# Patient Record
Sex: FEMALE | Race: OTHER | HISPANIC OR LATINO | URBAN - METROPOLITAN AREA
[De-identification: names, ages, dates, MRNs, and addresses within clinical notes are randomized per-mention and may not be internally consistent; named-entity substitution may affect disease eponyms.]

---

## 2017-07-11 ENCOUNTER — INPATIENT (INPATIENT)
Facility: HOSPITAL | Age: 27
LOS: 0 days | Discharge: HOME | DRG: 343 | End: 2017-07-12
Attending: SURGERY | Admitting: SURGERY
Payer: MEDICAID

## 2017-07-11 ENCOUNTER — RESULT REVIEW (OUTPATIENT)
Age: 27
End: 2017-07-11

## 2017-07-11 VITALS
OXYGEN SATURATION: 99 % | SYSTOLIC BLOOD PRESSURE: 127 MMHG | HEART RATE: 78 BPM | TEMPERATURE: 99 F | DIASTOLIC BLOOD PRESSURE: 84 MMHG | RESPIRATION RATE: 18 BRPM

## 2017-07-11 DIAGNOSIS — K35.3 ACUTE APPENDICITIS WITH LOCALIZED PERITONITIS: ICD-10-CM

## 2017-07-11 DIAGNOSIS — Z98.51 TUBAL LIGATION STATUS: Chronic | ICD-10-CM

## 2017-07-11 DIAGNOSIS — R10.9 UNSPECIFIED ABDOMINAL PAIN: ICD-10-CM

## 2017-07-11 LAB
ALBUMIN SERPL ELPH-MCNC: 4.4 G/DL — SIGNIFICANT CHANGE UP (ref 3.3–5)
ALP SERPL-CCNC: 63 U/L — SIGNIFICANT CHANGE UP (ref 40–120)
ALT FLD-CCNC: 19 U/L RC — SIGNIFICANT CHANGE UP (ref 10–45)
ANION GAP SERPL CALC-SCNC: 15 MMOL/L — SIGNIFICANT CHANGE UP (ref 5–17)
APPEARANCE UR: CLEAR — SIGNIFICANT CHANGE UP
APTT BLD: 28.5 SEC — SIGNIFICANT CHANGE UP (ref 27.5–37.4)
AST SERPL-CCNC: 20 U/L — SIGNIFICANT CHANGE UP (ref 10–40)
BASOPHILS # BLD AUTO: 0 K/UL — SIGNIFICANT CHANGE UP (ref 0–0.2)
BASOPHILS NFR BLD AUTO: 0.4 % — SIGNIFICANT CHANGE UP (ref 0–2)
BILIRUB SERPL-MCNC: 0.2 MG/DL — SIGNIFICANT CHANGE UP (ref 0.2–1.2)
BILIRUB UR-MCNC: NEGATIVE — SIGNIFICANT CHANGE UP
BLD GP AB SCN SERPL QL: NEGATIVE — SIGNIFICANT CHANGE UP
BUN SERPL-MCNC: 11 MG/DL — SIGNIFICANT CHANGE UP (ref 7–23)
CALCIUM SERPL-MCNC: 8.8 MG/DL — SIGNIFICANT CHANGE UP (ref 8.4–10.5)
CHLORIDE SERPL-SCNC: 103 MMOL/L — SIGNIFICANT CHANGE UP (ref 96–108)
CO2 SERPL-SCNC: 23 MMOL/L — SIGNIFICANT CHANGE UP (ref 22–31)
COLOR SPEC: YELLOW — SIGNIFICANT CHANGE UP
CREAT SERPL-MCNC: 0.59 MG/DL — SIGNIFICANT CHANGE UP (ref 0.5–1.3)
DIFF PNL FLD: ABNORMAL
EOSINOPHIL # BLD AUTO: 0.1 K/UL — SIGNIFICANT CHANGE UP (ref 0–0.5)
EOSINOPHIL NFR BLD AUTO: 1.3 % — SIGNIFICANT CHANGE UP (ref 0–6)
EPI CELLS # UR: SIGNIFICANT CHANGE UP /HPF
GAS PNL BLDV: SIGNIFICANT CHANGE UP
GLUCOSE SERPL-MCNC: 117 MG/DL — HIGH (ref 70–99)
GLUCOSE UR QL: NEGATIVE — SIGNIFICANT CHANGE UP
HCG SERPL-ACNC: <2 MIU/ML — SIGNIFICANT CHANGE UP
HCT VFR BLD CALC: 39.2 % — SIGNIFICANT CHANGE UP (ref 34.5–45)
HGB BLD-MCNC: 13.8 G/DL — SIGNIFICANT CHANGE UP (ref 11.5–15.5)
INR BLD: 1.02 RATIO — SIGNIFICANT CHANGE UP (ref 0.88–1.16)
KETONES UR-MCNC: NEGATIVE — SIGNIFICANT CHANGE UP
LEUKOCYTE ESTERASE UR-ACNC: NEGATIVE — SIGNIFICANT CHANGE UP
LYMPHOCYTES # BLD AUTO: 2.2 K/UL — SIGNIFICANT CHANGE UP (ref 1–3.3)
LYMPHOCYTES # BLD AUTO: 24.5 % — SIGNIFICANT CHANGE UP (ref 13–44)
MCHC RBC-ENTMCNC: 30.9 PG — SIGNIFICANT CHANGE UP (ref 27–34)
MCHC RBC-ENTMCNC: 35.1 GM/DL — SIGNIFICANT CHANGE UP (ref 32–36)
MCV RBC AUTO: 88 FL — SIGNIFICANT CHANGE UP (ref 80–100)
MONOCYTES # BLD AUTO: 0.5 K/UL — SIGNIFICANT CHANGE UP (ref 0–0.9)
MONOCYTES NFR BLD AUTO: 5.8 % — SIGNIFICANT CHANGE UP (ref 2–14)
NEUTROPHILS # BLD AUTO: 6.2 K/UL — SIGNIFICANT CHANGE UP (ref 1.8–7.4)
NEUTROPHILS NFR BLD AUTO: 67.9 % — SIGNIFICANT CHANGE UP (ref 43–77)
NITRITE UR-MCNC: NEGATIVE — SIGNIFICANT CHANGE UP
PH UR: 6 — SIGNIFICANT CHANGE UP (ref 5–8)
PLATELET # BLD AUTO: 184 K/UL — SIGNIFICANT CHANGE UP (ref 150–400)
POTASSIUM SERPL-MCNC: 3.6 MMOL/L — SIGNIFICANT CHANGE UP (ref 3.5–5.3)
POTASSIUM SERPL-SCNC: 3.6 MMOL/L — SIGNIFICANT CHANGE UP (ref 3.5–5.3)
PROT SERPL-MCNC: 7.5 G/DL — SIGNIFICANT CHANGE UP (ref 6–8.3)
PROT UR-MCNC: NEGATIVE — SIGNIFICANT CHANGE UP
PROTHROM AB SERPL-ACNC: 11.1 SEC — SIGNIFICANT CHANGE UP (ref 9.8–12.7)
RBC # BLD: 4.46 M/UL — SIGNIFICANT CHANGE UP (ref 3.8–5.2)
RBC # FLD: 11.6 % — SIGNIFICANT CHANGE UP (ref 10.3–14.5)
RH IG SCN BLD-IMP: POSITIVE — SIGNIFICANT CHANGE UP
SODIUM SERPL-SCNC: 141 MMOL/L — SIGNIFICANT CHANGE UP (ref 135–145)
SP GR SPEC: 1.02 — SIGNIFICANT CHANGE UP (ref 1.01–1.02)
UROBILINOGEN FLD QL: NEGATIVE — SIGNIFICANT CHANGE UP
WBC # BLD: 9.2 K/UL — SIGNIFICANT CHANGE UP (ref 3.8–10.5)
WBC # FLD AUTO: 9.2 K/UL — SIGNIFICANT CHANGE UP (ref 3.8–10.5)
WBC UR QL: SIGNIFICANT CHANGE UP /HPF (ref 0–5)

## 2017-07-11 PROCEDURE — 99285 EMERGENCY DEPT VISIT HI MDM: CPT | Mod: 25

## 2017-07-11 PROCEDURE — 76830 TRANSVAGINAL US NON-OB: CPT | Mod: 26

## 2017-07-11 PROCEDURE — 44970 LAPAROSCOPY APPENDECTOMY: CPT

## 2017-07-11 PROCEDURE — 74177 CT ABD & PELVIS W/CONTRAST: CPT | Mod: 26

## 2017-07-11 PROCEDURE — 93975 VASCULAR STUDY: CPT | Mod: 26

## 2017-07-11 RX ORDER — KETOROLAC TROMETHAMINE 30 MG/ML
15 SYRINGE (ML) INJECTION ONCE
Qty: 0 | Refills: 0 | Status: DISCONTINUED | OUTPATIENT
Start: 2017-07-11 | End: 2017-07-11

## 2017-07-11 RX ORDER — ACETAMINOPHEN 500 MG
650 TABLET ORAL ONCE
Qty: 0 | Refills: 0 | Status: COMPLETED | OUTPATIENT
Start: 2017-07-11 | End: 2017-07-11

## 2017-07-11 RX ORDER — ACETAMINOPHEN 500 MG
650 TABLET ORAL ONCE
Qty: 0 | Refills: 0 | Status: DISCONTINUED | OUTPATIENT
Start: 2017-07-11 | End: 2017-07-11

## 2017-07-11 RX ORDER — MORPHINE SULFATE 50 MG/1
2 CAPSULE, EXTENDED RELEASE ORAL ONCE
Qty: 0 | Refills: 0 | Status: DISCONTINUED | OUTPATIENT
Start: 2017-07-11 | End: 2017-07-11

## 2017-07-11 RX ORDER — SODIUM CHLORIDE 9 MG/ML
1000 INJECTION, SOLUTION INTRAVENOUS ONCE
Qty: 0 | Refills: 0 | Status: COMPLETED | OUTPATIENT
Start: 2017-07-11 | End: 2017-07-11

## 2017-07-11 RX ORDER — ACETAMINOPHEN 500 MG
1000 TABLET ORAL ONCE
Qty: 0 | Refills: 0 | Status: COMPLETED | OUTPATIENT
Start: 2017-07-11 | End: 2017-07-11

## 2017-07-11 RX ORDER — ONDANSETRON 8 MG/1
4 TABLET, FILM COATED ORAL ONCE
Qty: 0 | Refills: 0 | Status: COMPLETED | OUTPATIENT
Start: 2017-07-11 | End: 2017-07-11

## 2017-07-11 RX ORDER — SODIUM CHLORIDE 9 MG/ML
2000 INJECTION INTRAMUSCULAR; INTRAVENOUS; SUBCUTANEOUS ONCE
Qty: 0 | Refills: 0 | Status: COMPLETED | OUTPATIENT
Start: 2017-07-11 | End: 2017-07-11

## 2017-07-11 RX ORDER — MORPHINE SULFATE 50 MG/1
4 CAPSULE, EXTENDED RELEASE ORAL ONCE
Qty: 0 | Refills: 0 | Status: DISCONTINUED | OUTPATIENT
Start: 2017-07-11 | End: 2017-07-11

## 2017-07-11 RX ORDER — SODIUM CHLORIDE 9 MG/ML
1000 INJECTION INTRAMUSCULAR; INTRAVENOUS; SUBCUTANEOUS ONCE
Qty: 0 | Refills: 0 | Status: COMPLETED | OUTPATIENT
Start: 2017-07-11 | End: 2017-07-11

## 2017-07-11 RX ORDER — PIPERACILLIN AND TAZOBACTAM 4; .5 G/20ML; G/20ML
3.38 INJECTION, POWDER, LYOPHILIZED, FOR SOLUTION INTRAVENOUS ONCE
Qty: 0 | Refills: 0 | Status: COMPLETED | OUTPATIENT
Start: 2017-07-11 | End: 2017-07-11

## 2017-07-11 RX ADMIN — Medication 15 MILLIGRAM(S): at 13:09

## 2017-07-11 RX ADMIN — MORPHINE SULFATE 4 MILLIGRAM(S): 50 CAPSULE, EXTENDED RELEASE ORAL at 22:30

## 2017-07-11 RX ADMIN — ONDANSETRON 4 MILLIGRAM(S): 8 TABLET, FILM COATED ORAL at 09:09

## 2017-07-11 RX ADMIN — MORPHINE SULFATE 2 MILLIGRAM(S): 50 CAPSULE, EXTENDED RELEASE ORAL at 08:25

## 2017-07-11 RX ADMIN — PIPERACILLIN AND TAZOBACTAM 200 GRAM(S): 4; .5 INJECTION, POWDER, LYOPHILIZED, FOR SOLUTION INTRAVENOUS at 18:03

## 2017-07-11 RX ADMIN — MORPHINE SULFATE 2 MILLIGRAM(S): 50 CAPSULE, EXTENDED RELEASE ORAL at 13:09

## 2017-07-11 RX ADMIN — Medication 15 MILLIGRAM(S): at 15:02

## 2017-07-11 RX ADMIN — MORPHINE SULFATE 4 MILLIGRAM(S): 50 CAPSULE, EXTENDED RELEASE ORAL at 21:20

## 2017-07-11 RX ADMIN — MORPHINE SULFATE 4 MILLIGRAM(S): 50 CAPSULE, EXTENDED RELEASE ORAL at 17:57

## 2017-07-11 RX ADMIN — SODIUM CHLORIDE 1000 MILLILITER(S): 9 INJECTION INTRAMUSCULAR; INTRAVENOUS; SUBCUTANEOUS at 08:25

## 2017-07-11 RX ADMIN — Medication 650 MILLIGRAM(S): at 23:06

## 2017-07-11 RX ADMIN — MORPHINE SULFATE 4 MILLIGRAM(S): 50 CAPSULE, EXTENDED RELEASE ORAL at 13:09

## 2017-07-11 RX ADMIN — Medication 1000 MILLIGRAM(S): at 09:19

## 2017-07-11 RX ADMIN — Medication 400 MILLIGRAM(S): at 07:55

## 2017-07-11 RX ADMIN — MORPHINE SULFATE 2 MILLIGRAM(S): 50 CAPSULE, EXTENDED RELEASE ORAL at 10:04

## 2017-07-11 RX ADMIN — ONDANSETRON 4 MILLIGRAM(S): 8 TABLET, FILM COATED ORAL at 10:02

## 2017-07-11 RX ADMIN — SODIUM CHLORIDE 2000 MILLILITER(S): 9 INJECTION INTRAMUSCULAR; INTRAVENOUS; SUBCUTANEOUS at 15:02

## 2017-07-11 RX ADMIN — Medication 15 MILLIGRAM(S): at 17:57

## 2017-07-11 RX ADMIN — MORPHINE SULFATE 2 MILLIGRAM(S): 50 CAPSULE, EXTENDED RELEASE ORAL at 09:09

## 2017-07-11 RX ADMIN — SODIUM CHLORIDE 1000 MILLILITER(S): 9 INJECTION, SOLUTION INTRAVENOUS at 18:03

## 2017-07-11 RX ADMIN — Medication 15 MILLIGRAM(S): at 10:02

## 2017-07-11 NOTE — ED ADULT NURSE REASSESSMENT NOTE - NS ED NURSE REASSESS COMMENT FT1
pt states she is feeling better, still experiencing 5/10 right abdominal pain and headache. pt vital signs taken and documented. pt has been ambulatory to the bathroom with her . no longer having chills.   still observing the patient.

## 2017-07-11 NOTE — ED ADULT NURSE REASSESSMENT NOTE - NS ED NURSE REASSESS COMMENT FT1
pt laying on side in pain, discussing with team the need for pain medication. pending order.    remains at bedside.

## 2017-07-11 NOTE — ED PROVIDER NOTE - MEDICAL DECISION MAKING DETAILS
Mojgan Eubanks, DO: 27F with complicated OB hx here for diffuse abdominal pain acutely worse since last night. Evaluation for ectopic vs. torsion given adnexal TTP. Suspicious for hemorrhagic cyst. Pt with +McBurneys & +Oberlin signs, will eval for biliary pathology on US. If negative w/u, will likely CTAP to rule out appendiceal.

## 2017-07-11 NOTE — H&P ADULT - HISTORY OF PRESENT ILLNESS
27F, h/o ectopic pregnancy and tubal ligation, presents complaining of 10 days of abdominal pain. Denies fevers/chills at home. Reports nausea. Passing stool and gas.

## 2017-07-11 NOTE — ED PROVIDER NOTE - PHYSICAL EXAMINATION
Mojgan Eubanks, DO:   Gen: Well appearing, NAD  Head: NCAT  HEENT: PERRL, MMM, normal conjunctiva, anicteric, neck supple  Lung: CTAB, no adventitious sounds  CV: RRR, no murmurs, rubs or gallops  Abd: soft, TTP diffusely with +McBurney's & +Olsen's sign, greatest in RLQ. No peritoneal signs. No rebound. No CVAT.  : external genitalia normal, no CMT, os closed, left adnexal TTP   MSK: No edema, no visible deformities  Neuro: No focal neurologic deficits. CN II-XII grossly intact. 5/5 strength and normal sensation in all extremities.  Skin: Warm and dry, no evidence of rash  Psych: normal mood and affect Mojgan Eubanks, DO:   Gen: Well appearing, NAD  Head: NCAT  HEENT: PERRL, MMM, normal conjunctiva, anicteric, neck supple  Lung: CTAB, no adventitious sounds  CV: RRR, no murmurs, rubs or gallops  Abd: soft, TTP diffusely with +McBurney's & +Olsen's sign, greatest in RLQ. No peritoneal signs. No rebound. No CVAT.  : external genitalia normal, no CMT, os closed, left adnexal TTP (Chaperone: Fredy JANG, medical student).  MSK: No edema, no visible deformities  Neuro: No focal neurologic deficits. CN II-XII grossly intact. 5/5 strength and normal sensation in all extremities.  Skin: Warm and dry, no evidence of rash  Psych: normal mood and affect

## 2017-07-11 NOTE — ED PROVIDER NOTE - NS ED ROS FT
Mojgan Eubanks, DO: ROS: denies HA, weakness, dizziness, chest pain, SOB, diaphoresis, diarrhea, joint pain, neuro deficits, dysuria/hematuria, rash.

## 2017-07-11 NOTE — ED ADULT NURSE NOTE - OBJECTIVE STATEMENT
27 y.o female presents ambulatory c.o abdominal pain for 10 days that worsened last night around midnight. diffuse 10/10 generalized abdominal pain with nausea, denies vomiting/diarrhea/ urinary symptoms/ back pain. pt took Tylenol for pain, denies relief. states she did not go to the doctor during the 10 days because she believed the pain would resolve. hx of dm, Salpingectomy s.p delivery last year. denies chest pain/sob/fever/chills, no recent travel, no blood in urine/ stool. tolerating po well. boyfriend at the bedside.   Patient undressed and placed into gown, call bell in hand and side rails up for safety. warm blanket provided, vital signs stable, pt in no acute distress.

## 2017-07-11 NOTE — ED ADULT NURSE REASSESSMENT NOTE - NS ED NURSE REASSESS COMMENT FT1
reassessment performed, pt tearful, shaking and rigors noted. vital signs taken and documented. pt c.o 10/10 abdominal pain that has not improved.   blankets from the warmer provided. MD at the bedside for reassessment, pain medications ordered for patient. reassessment performed, pt tearful, shaking and rigors noted. vital signs taken and documented. pt c.o 10/10 abdominal pain that has not improved.   blankets from the warmer provided. MD at the bedside for reassessment, pain medications ordered for patient. eyes appear red b/l, denies itching/dryness reassessment performed, pt tearful, shaking and rigors noted. vital signs taken and documented. pt c.o 10/10 abdominal pain that has not improved.   blankets from the warmer provided. MD at the bedside for reassessment, pain medications ordered for patient. eyes appear red b/l, denies itching/dryness  discussed with resident the need for further assessment.

## 2017-07-11 NOTE — ED PROVIDER NOTE - ATTENDING CONTRIBUTION TO CARE
patient complaining of b/l lower abd pain. llq and rlq tenderness. r/o ectopic.  if not pregnant, concern for ovarian pathology. will perform US and reassess.

## 2017-07-11 NOTE — ED ADULT NURSE NOTE - CHPI ED SYMPTOMS NEG
no burning urination/no fever/no vomiting/no chills/no dysuria/no hematuria/no diarrhea/no abdominal distension/no blood in stool

## 2017-07-11 NOTE — ED PROCEDURE NOTE - PROCEDURE ADDITIONAL DETAILS
Emergency Department Focused Ultrasound performed at patient's bedside.  The complete report can be found in PACS.
Mojgan Eubanks DO: POCUS: Emergency Department Focused Ultrasound performed at patient's bedside.  The complete report will be available in PACS.

## 2017-07-11 NOTE — ED PROVIDER NOTE - OBJECTIVE STATEMENT
Mojgan Eubanks, DO: 27F with hx of tubal ligation & history of ectopic per chart review but patient denies here for diffuse abdominal pain x 10 days. Pain was controlled & intermittent, described as cramping until last night when it became constant. No exacerbating/relieving factors. Last night, pt started with nausea last night but no vomiting. No fevers/chills, back pain, dysuria, urinary frequency/urgency. LMP Friday w/ prior LMP 1 mo before. No vaginal discharge.    No PMD  OBGYN: clinic

## 2017-07-11 NOTE — ED ADULT NURSE REASSESSMENT NOTE - NS ED NURSE REASSESS COMMENT FT1
Recvd pt at 1930 awake, alert and responsive to all stimuli.  no sob or respiratory distress noted.  pt c/o RLQ pain and was medicated, as per md's orders.  pt has been admitted to surgical team and is awaiting admitting bed.  will continue to monitor.

## 2017-07-11 NOTE — H&P ADULT - NSHPLABSRESULTS_GEN_ALL_CORE
13.8   9.2   )-----------( 184      ( 2017 08:16 )             39.2                 PT/INR - ( 2017 08:16 )   PT: 11.1 sec;   INR: 1.02 ratio         PTT - ( 2017 08:16 )  PTT:28.5 sec        141  |  103  |  11  ----------------------------<  117<H>  3.6   |  23  |  0.59    Ca    8.8      2017 08:16    TPro  7.5  /  Alb  4.4  /  TBili  0.2  /  DBili  x   /  AST  20  /  ALT  19  /  AlkPhos  63      HCG < 2    LIVER FUNCTIONS - ( 2017 08:16 )  Alb: 4.4 g/dL / Pro: 7.5 g/dL / ALK PHOS: 63 U/L / ALT: 19 U/L RC / AST: 20 U/L / GGT: x             Urinalysis Basic - ( 2017 09:59 )    Color: Yellow / Appearance: Clear / S.022 / pH: x  Gluc: x / Ketone: Negative  / Bili: Negative / Urobili: Negative   Blood: x / Protein: Negative / Nitrite: Negative   Leuk Esterase: Negative / RBC: x / WBC 0-2 /HPF   Sq Epi: x / Non Sq Epi: OCC /HPF / Bacteria: x    IMAGING:  < from: CT Abdomen and Pelvis w/ Oral Cont and w/ IV Cont (17 @ 16:56) >    Acute early appendicitis. No evidence of abscess or   perforation.    < end of copied text >

## 2017-07-11 NOTE — ED PROVIDER NOTE - PROGRESS NOTE DETAILS
Mojgan Eubanks DO: Pt initially declined zofran. 1 episode of NBNB vomiting at bedside. Will give zofran & additional pain medications now. Mojgan Eubanks DO: No evidence of hemorrhagic cysts or free fluids on US. Pt with patel score 3 - will provide expectant management & d/c home. Mojgan Eubanks DO: Reassessed pt, pt w/ diffuse TTP. Discussed with attending, plan to CTAP. Mojgan Eubanks, DO: No evidence of hemorrhagic cysts or free fluids on US. Pt with patel score 3 - plan to provide expectant management & d/c home. However, pt still in US, will reassess when she returns prior to d/c. ZR ct scan revealed acute AP surgery called

## 2017-07-11 NOTE — ED ADULT NURSE REASSESSMENT NOTE - NS ED NURSE REASSESS COMMENT FT1
discussed with resident about plan of care and patients appearance.   pt vital signs documented, bp low. fluids administered per order and pain medication given. held morphine due to low bp.   pt states pain is increasing again, tachycardic 112, diaphoretic with temperature of 99.4.   warm blankets provided. pt resting in bed waiting for ct scan.

## 2017-07-11 NOTE — ED ADULT NURSE REASSESSMENT NOTE - NS ED NURSE REASSESS COMMENT FT1
pt continues to complain of 10/10 abdominal pain. understands that she is going up for an ultrasound.   pt given pain medication per order.

## 2017-07-12 VITALS
HEART RATE: 72 BPM | DIASTOLIC BLOOD PRESSURE: 61 MMHG | SYSTOLIC BLOOD PRESSURE: 107 MMHG | TEMPERATURE: 98 F | RESPIRATION RATE: 18 BRPM | OXYGEN SATURATION: 99 %

## 2017-07-12 PROCEDURE — 80053 COMPREHEN METABOLIC PANEL: CPT

## 2017-07-12 PROCEDURE — 81001 URINALYSIS AUTO W/SCOPE: CPT

## 2017-07-12 PROCEDURE — 96376 TX/PRO/DX INJ SAME DRUG ADON: CPT

## 2017-07-12 PROCEDURE — 82803 BLOOD GASES ANY COMBINATION: CPT

## 2017-07-12 PROCEDURE — 84295 ASSAY OF SERUM SODIUM: CPT

## 2017-07-12 PROCEDURE — 84132 ASSAY OF SERUM POTASSIUM: CPT

## 2017-07-12 PROCEDURE — 86900 BLOOD TYPING SEROLOGIC ABO: CPT

## 2017-07-12 PROCEDURE — 82947 ASSAY GLUCOSE BLOOD QUANT: CPT

## 2017-07-12 PROCEDURE — 76705 ECHO EXAM OF ABDOMEN: CPT | Mod: 26

## 2017-07-12 PROCEDURE — 74177 CT ABD & PELVIS W/CONTRAST: CPT

## 2017-07-12 PROCEDURE — 44970 LAPAROSCOPY APPENDECTOMY: CPT

## 2017-07-12 PROCEDURE — 88304 TISSUE EXAM BY PATHOLOGIST: CPT

## 2017-07-12 PROCEDURE — 85027 COMPLETE CBC AUTOMATED: CPT

## 2017-07-12 PROCEDURE — 83605 ASSAY OF LACTIC ACID: CPT

## 2017-07-12 PROCEDURE — 96374 THER/PROPH/DIAG INJ IV PUSH: CPT | Mod: XU

## 2017-07-12 PROCEDURE — 85014 HEMATOCRIT: CPT

## 2017-07-12 PROCEDURE — 76830 TRANSVAGINAL US NON-OB: CPT

## 2017-07-12 PROCEDURE — 82435 ASSAY OF BLOOD CHLORIDE: CPT

## 2017-07-12 PROCEDURE — 88304 TISSUE EXAM BY PATHOLOGIST: CPT | Mod: 26

## 2017-07-12 PROCEDURE — 85730 THROMBOPLASTIN TIME PARTIAL: CPT

## 2017-07-12 PROCEDURE — 93975 VASCULAR STUDY: CPT

## 2017-07-12 PROCEDURE — 76705 ECHO EXAM OF ABDOMEN: CPT

## 2017-07-12 PROCEDURE — 86850 RBC ANTIBODY SCREEN: CPT

## 2017-07-12 PROCEDURE — 82330 ASSAY OF CALCIUM: CPT

## 2017-07-12 PROCEDURE — 86901 BLOOD TYPING SEROLOGIC RH(D): CPT

## 2017-07-12 PROCEDURE — 85610 PROTHROMBIN TIME: CPT

## 2017-07-12 PROCEDURE — 84702 CHORIONIC GONADOTROPIN TEST: CPT

## 2017-07-12 PROCEDURE — 96375 TX/PRO/DX INJ NEW DRUG ADDON: CPT

## 2017-07-12 PROCEDURE — 99285 EMERGENCY DEPT VISIT HI MDM: CPT | Mod: 25

## 2017-07-12 RX ORDER — SODIUM CHLORIDE 9 MG/ML
1000 INJECTION, SOLUTION INTRAVENOUS
Qty: 0 | Refills: 0 | Status: DISCONTINUED | OUTPATIENT
Start: 2017-07-12 | End: 2017-07-12

## 2017-07-12 RX ORDER — HYDROMORPHONE HYDROCHLORIDE 2 MG/ML
0.5 INJECTION INTRAMUSCULAR; INTRAVENOUS; SUBCUTANEOUS
Qty: 0 | Refills: 0 | Status: DISCONTINUED | OUTPATIENT
Start: 2017-07-12 | End: 2017-07-12

## 2017-07-12 RX ADMIN — SODIUM CHLORIDE 100 MILLILITER(S): 9 INJECTION, SOLUTION INTRAVENOUS at 06:45

## 2017-07-12 NOTE — CHART NOTE - NSCHARTNOTEFT_GEN_A_CORE
S: Patient seen and examined. Resting comfortably in bed. Endorses mild RLQ pain. Voiding    O: Vital Signs Last 24 Hrs  T(C): 36.8 (12 Jul 2017 04:00), Max: 38.6 (11 Jul 2017 23:44)  T(F): 98.2 (12 Jul 2017 04:00), Max: 101.5 (11 Jul 2017 23:44)  HR: 73 (12 Jul 2017 05:00) (69 - 118)  BP: 101/52 (12 Jul 2017 05:00) (91/54 - 133/85)  BP(mean): 71 (12 Jul 2017 05:00) (71 - 76)  RR: 14 (12 Jul 2017 05:00) (14 - 20)  SpO2: 97% (12 Jul 2017 05:00) (95% - 100%)    Gen: resting comfortably  Resp: CTAB  CV: RRR  Abd: soft, appropriately tender in RLQ, ND  Inscision: c/d/i.    A/P:  27F s/p uncomplicated lap appy.   -Stable in PACU, when clear criteria can go home after 6 hrs observation.

## 2017-07-12 NOTE — ASU DISCHARGE PLAN (ADULT/PEDIATRIC). - INSTRUCTIONS
Call Dr. Mishra's office at [phone] to schedule a follow up appointment for 1-2 weeks from now.

## 2017-07-12 NOTE — ASU DISCHARGE PLAN (ADULT/PEDIATRIC). - MEDICATION SUMMARY - MEDICATIONS TO TAKE
I will START or STAY ON the medications listed below when I get home from the hospital:    oxycodone-acetaminophen 5mg-325mg oral tablet  -- 1 tab(s) by mouth every 6 hours, As Needed -for moderate pain MDD:8  -- Caution federal law prohibits the transfer of this drug to any person other  than the person for whom it was prescribed.  May cause drowsiness.  Alcohol may intensify this effect.  Use care when operating dangerous machinery.  This prescription cannot be refilled.  This product contains acetaminophen.  Do not use  with any other product containing acetaminophen to prevent possible liver damage.  Using more of this medication than prescribed may cause serious breathing problems.    -- Indication: For Post-operative Pain

## 2017-07-12 NOTE — ASU DISCHARGE PLAN (ADULT/PEDIATRIC). - NOTIFY
Persistent Nausea and Vomiting/Pain not relieved by Medications/Fever greater than 101/Bleeding that does not stop/Unable to Urinate/Inability to Tolerate Liquids or Foods

## 2017-07-18 ENCOUNTER — APPOINTMENT (OUTPATIENT)
Dept: TRAUMA SURGERY | Facility: CLINIC | Age: 27
End: 2017-07-18

## 2017-07-18 VITALS
TEMPERATURE: 98.2 F | WEIGHT: 144 LBS | HEIGHT: 63.5 IN | HEART RATE: 69 BPM | SYSTOLIC BLOOD PRESSURE: 123 MMHG | BODY MASS INDEX: 25.2 KG/M2 | DIASTOLIC BLOOD PRESSURE: 76 MMHG

## 2017-07-18 DIAGNOSIS — K35.3 ACUTE APPENDICITIS WITH LOCALIZED PERITONITIS: ICD-10-CM

## 2017-07-18 LAB — SURGICAL PATHOLOGY STUDY: SIGNIFICANT CHANGE UP

## 2017-07-20 ENCOUNTER — TRANSCRIPTION ENCOUNTER (OUTPATIENT)
Age: 27
End: 2017-07-20

## 2018-10-21 NOTE — ED ADULT NURSE NOTE - ED STAT RN HAND OFF
MR#: [id number]

Account#: [id number]

Accession#: [id number]

Date of Study: 10/21/2018

Ordering Physician: PATRICIA DAWSON, 

Referring Physician: TORIE MAXWELL 

Tech: Loni Nellie





--------------- APPROVED REPORT --------------





EXAM: Two-dimensional and M-mode echocardiogram with Doppler and color Doppler.



Other Information 

Quality : AverageHR: 80bpm

Rhythm : NSR



INDICATION

Thrombus 

PVC's



2D DIMENSIONS 

RVDd1.4 (2.9-3.5cm)Left Atrium(2D)2.5 (1.6-4.0cm)

IVSd1.3 (0.7-1.1cm)Aortic Root(2D)3.2 (2.0-3.7cm)

LVDd4.6 (3.9-5.9cm)LVOT Diameter2.0 (1.8-2.4cm)

PWd1.3 (0.7-1.1cm)LVDs3.4 (2.5-4.0cm)

FS (%) 24.7 %SV47.1 ml

LVEF(%)49.0 (>50%)



Aortic Valve

AoV Peak Marshall.155.1cm/sAoV VTI28.3cm

AO Peak GR.9.6mmHgLVOT Peak Marshall.86.9cm/s

AO Mean GR.5mmHgAVA (VMAX)1.72cm2



Mitral Valve

MV E Uvpobbdd35.6cm/sMV DECEL JOEL720uh

MV A Cvpdlgub50.3cm/sE/A  Ratio0.5



Pulmonary Valve

PV Peak Xvdubesq36.0cm/s



Tricuspid Valve

TR P. Gklzytsw249al/sRAP AEUNPOXK5fiFl

TR Peak Gr.80haGsIUPR53xpDr



Pulmonary Vein

S1 Mjspsegb91.7cm/sD2 Bmzohhom22.1cm/s

PVa swixshfv189ephi



 LEFT VENTRICLE 

The left ventricle is normal size. There is moderate concentric left ventricular hypertrophy. The lef
t ventricular systolic function is low normal. Difficult to estimate due to PVC's. EF 45-50% Septal m
otion consistent with conduction abnormality. Transmitral Doppler flow pattern is Grade I-abnormal re
laxation pattern.



 RIGHT VENTRICLE 

The right ventricle is normal size. There is normal right ventricular wall thickness. The right ventr
icular systolic function is normal.



 ATRIA 

The left atrium size is normal. The right atrium size is normal. The interatrial septum is intact wit
h no evidence for an atrial septal defect or patent foramen ovale as noted on 2-D or Doppler imaging.




 AORTIC VALVE 

The aortic valve is normal in structure and function. Doppler and Color Flow revealed trace aortic re
gurgitation. Calculated aortic valve area is 2.1 cm2 with maximum pressure gradient of 10 mmHg and me
an pressure gradient of 6 mmHg.



 MITRAL VALVE 

The mitral valve is thickened but opens well. There is no mitral valve stenosis. Doppler and Color-fl
ow revealed trace mitral regurgitation.



 TRICUSPID VALVE 

The tricuspid valve is normal in structure and function. Doppler and Color Flow revealed trace tricus
pid regurgitation. There is no tricuspid valve stenosis. 



 PULMONIC VALVE 

The pulmonic valve is not well visualized. Doppler and Color Flow revealed trace pulmonic valvular re
gurgitation. There is no pulmonic valvular stenosis.



 GREAT VESSELS 

The aortic root is normal in size. The IVC is normal in size and collapses >50% with inspiration.



 PERICARDIAL EFFUSION 

There is no evidence of significant pericardial effusion.



Critical Notification

Critical Value: No



<Conclusion>

The left ventricular systolic function is low normal. Difficult to estimate due to PVC's. EF 45-50%

There is moderate concentric left ventricular hypertrophy.



Signed by : Patricia Dawson, 

Electronically Approved : 10/21/2018 12:42:01 Handoff

## 2019-09-13 NOTE — ED ADULT NURSE NOTE - LATERALITY
Patient came in today looking to give a kidney to her grandmother.  Discussion was done regarding donor safety  Focus on BMI with a goal of 175lbs was done. No workup started at this time.  She was instructed to call once her weight goal was obtained.     generalized

## 2019-09-25 ENCOUNTER — EMERGENCY (EMERGENCY)
Facility: HOSPITAL | Age: 29
LOS: 1 days | Discharge: HOME | End: 2019-09-25
Attending: EMERGENCY MEDICINE
Payer: MEDICAID

## 2019-09-25 VITALS
SYSTOLIC BLOOD PRESSURE: 170 MMHG | HEART RATE: 82 BPM | WEIGHT: 160.94 LBS | RESPIRATION RATE: 18 BRPM | OXYGEN SATURATION: 99 % | TEMPERATURE: 98 F | HEIGHT: 63 IN | DIASTOLIC BLOOD PRESSURE: 100 MMHG

## 2019-09-25 VITALS
DIASTOLIC BLOOD PRESSURE: 87 MMHG | HEART RATE: 88 BPM | SYSTOLIC BLOOD PRESSURE: 142 MMHG | RESPIRATION RATE: 18 BRPM | TEMPERATURE: 98 F | OXYGEN SATURATION: 99 %

## 2019-09-25 DIAGNOSIS — Z98.51 TUBAL LIGATION STATUS: Chronic | ICD-10-CM

## 2019-09-25 LAB
ALBUMIN SERPL ELPH-MCNC: 4.2 G/DL — SIGNIFICANT CHANGE UP (ref 3.3–5)
ALBUMIN SERPL ELPH-MCNC: 4.5 G/DL — SIGNIFICANT CHANGE UP (ref 3.3–5)
ALP SERPL-CCNC: 69 U/L — SIGNIFICANT CHANGE UP (ref 40–120)
ALP SERPL-CCNC: 70 U/L — SIGNIFICANT CHANGE UP (ref 40–120)
ALT FLD-CCNC: 113 U/L — HIGH (ref 10–45)
ALT FLD-CCNC: 119 U/L — HIGH (ref 10–45)
ANION GAP SERPL CALC-SCNC: 11 MMOL/L — SIGNIFICANT CHANGE UP (ref 5–17)
ANION GAP SERPL CALC-SCNC: 15 MMOL/L — SIGNIFICANT CHANGE UP (ref 5–17)
APPEARANCE UR: ABNORMAL
APTT BLD: 28.3 SEC — SIGNIFICANT CHANGE UP (ref 27.5–36.3)
AST SERPL-CCNC: 111 U/L — HIGH (ref 10–40)
AST SERPL-CCNC: 144 U/L — HIGH (ref 10–40)
BACTERIA # UR AUTO: NEGATIVE — SIGNIFICANT CHANGE UP
BASOPHILS # BLD AUTO: 0 K/UL — SIGNIFICANT CHANGE UP (ref 0–0.2)
BASOPHILS NFR BLD AUTO: 0.6 % — SIGNIFICANT CHANGE UP (ref 0–2)
BILIRUB SERPL-MCNC: 0.3 MG/DL — SIGNIFICANT CHANGE UP (ref 0.2–1.2)
BILIRUB SERPL-MCNC: 0.4 MG/DL — SIGNIFICANT CHANGE UP (ref 0.2–1.2)
BILIRUB UR-MCNC: NEGATIVE — SIGNIFICANT CHANGE UP
BUN SERPL-MCNC: 7 MG/DL — SIGNIFICANT CHANGE UP (ref 7–23)
BUN SERPL-MCNC: 8 MG/DL — SIGNIFICANT CHANGE UP (ref 7–23)
CALCIUM SERPL-MCNC: 8.7 MG/DL — SIGNIFICANT CHANGE UP (ref 8.4–10.5)
CALCIUM SERPL-MCNC: 9.2 MG/DL — SIGNIFICANT CHANGE UP (ref 8.4–10.5)
CHLORIDE SERPL-SCNC: 101 MMOL/L — SIGNIFICANT CHANGE UP (ref 96–108)
CHLORIDE SERPL-SCNC: 103 MMOL/L — SIGNIFICANT CHANGE UP (ref 96–108)
CO2 SERPL-SCNC: 22 MMOL/L — SIGNIFICANT CHANGE UP (ref 22–31)
CO2 SERPL-SCNC: 23 MMOL/L — SIGNIFICANT CHANGE UP (ref 22–31)
COLOR SPEC: COLORLESS — SIGNIFICANT CHANGE UP
CREAT SERPL-MCNC: 0.55 MG/DL — SIGNIFICANT CHANGE UP (ref 0.5–1.3)
CREAT SERPL-MCNC: 0.56 MG/DL — SIGNIFICANT CHANGE UP (ref 0.5–1.3)
DIFF PNL FLD: NEGATIVE — SIGNIFICANT CHANGE UP
EOSINOPHIL # BLD AUTO: 0.1 K/UL — SIGNIFICANT CHANGE UP (ref 0–0.5)
EOSINOPHIL NFR BLD AUTO: 1.2 % — SIGNIFICANT CHANGE UP (ref 0–6)
EPI CELLS # UR: 3 /HPF — SIGNIFICANT CHANGE UP
GLUCOSE SERPL-MCNC: 90 MG/DL — SIGNIFICANT CHANGE UP (ref 70–99)
GLUCOSE SERPL-MCNC: 91 MG/DL — SIGNIFICANT CHANGE UP (ref 70–99)
GLUCOSE UR QL: NEGATIVE — SIGNIFICANT CHANGE UP
HCG SERPL-ACNC: HIGH MIU/ML
HCT VFR BLD CALC: 38.4 % — SIGNIFICANT CHANGE UP (ref 34.5–45)
HGB BLD-MCNC: 12.7 G/DL — SIGNIFICANT CHANGE UP (ref 11.5–15.5)
HYALINE CASTS # UR AUTO: 0 /LPF — SIGNIFICANT CHANGE UP (ref 0–2)
INR BLD: 1.02 RATIO — SIGNIFICANT CHANGE UP (ref 0.88–1.16)
KETONES UR-MCNC: NEGATIVE — SIGNIFICANT CHANGE UP
LEUKOCYTE ESTERASE UR-ACNC: ABNORMAL
LYMPHOCYTES # BLD AUTO: 2 K/UL — SIGNIFICANT CHANGE UP (ref 1–3.3)
LYMPHOCYTES # BLD AUTO: 26.1 % — SIGNIFICANT CHANGE UP (ref 13–44)
MCHC RBC-ENTMCNC: 29.2 PG — SIGNIFICANT CHANGE UP (ref 27–34)
MCHC RBC-ENTMCNC: 33.1 GM/DL — SIGNIFICANT CHANGE UP (ref 32–36)
MCV RBC AUTO: 88.2 FL — SIGNIFICANT CHANGE UP (ref 80–100)
MONOCYTES # BLD AUTO: 0.5 K/UL — SIGNIFICANT CHANGE UP (ref 0–0.9)
MONOCYTES NFR BLD AUTO: 6.4 % — SIGNIFICANT CHANGE UP (ref 2–14)
NEUTROPHILS # BLD AUTO: 5 K/UL — SIGNIFICANT CHANGE UP (ref 1.8–7.4)
NEUTROPHILS NFR BLD AUTO: 65.7 % — SIGNIFICANT CHANGE UP (ref 43–77)
NITRITE UR-MCNC: NEGATIVE — SIGNIFICANT CHANGE UP
PH UR: 7 — SIGNIFICANT CHANGE UP (ref 5–8)
PLATELET # BLD AUTO: 185 K/UL — SIGNIFICANT CHANGE UP (ref 150–400)
POTASSIUM SERPL-MCNC: 3.5 MMOL/L — SIGNIFICANT CHANGE UP (ref 3.5–5.3)
POTASSIUM SERPL-MCNC: 5.4 MMOL/L — HIGH (ref 3.5–5.3)
POTASSIUM SERPL-SCNC: 3.5 MMOL/L — SIGNIFICANT CHANGE UP (ref 3.5–5.3)
POTASSIUM SERPL-SCNC: 5.4 MMOL/L — HIGH (ref 3.5–5.3)
PROT SERPL-MCNC: 7.2 G/DL — SIGNIFICANT CHANGE UP (ref 6–8.3)
PROT SERPL-MCNC: 7.9 G/DL — SIGNIFICANT CHANGE UP (ref 6–8.3)
PROT UR-MCNC: NEGATIVE — SIGNIFICANT CHANGE UP
PROTHROM AB SERPL-ACNC: 11.7 SEC — SIGNIFICANT CHANGE UP (ref 10–12.9)
RBC # BLD: 4.35 M/UL — SIGNIFICANT CHANGE UP (ref 3.8–5.2)
RBC # FLD: 12.2 % — SIGNIFICANT CHANGE UP (ref 10.3–14.5)
RBC CASTS # UR COMP ASSIST: 2 /HPF — SIGNIFICANT CHANGE UP (ref 0–4)
SODIUM SERPL-SCNC: 137 MMOL/L — SIGNIFICANT CHANGE UP (ref 135–145)
SODIUM SERPL-SCNC: 138 MMOL/L — SIGNIFICANT CHANGE UP (ref 135–145)
SP GR SPEC: 1.01 — LOW (ref 1.01–1.02)
UROBILINOGEN FLD QL: NEGATIVE — SIGNIFICANT CHANGE UP
WBC # BLD: 7.5 K/UL — SIGNIFICANT CHANGE UP (ref 3.8–10.5)
WBC # FLD AUTO: 7.5 K/UL — SIGNIFICANT CHANGE UP (ref 3.8–10.5)
WBC UR QL: 5 /HPF — SIGNIFICANT CHANGE UP (ref 0–5)

## 2019-09-25 PROCEDURE — 99284 EMERGENCY DEPT VISIT MOD MDM: CPT

## 2019-09-25 PROCEDURE — 85610 PROTHROMBIN TIME: CPT

## 2019-09-25 PROCEDURE — 81001 URINALYSIS AUTO W/SCOPE: CPT

## 2019-09-25 PROCEDURE — 93975 VASCULAR STUDY: CPT | Mod: 26

## 2019-09-25 PROCEDURE — 87086 URINE CULTURE/COLONY COUNT: CPT

## 2019-09-25 PROCEDURE — 80053 COMPREHEN METABOLIC PANEL: CPT

## 2019-09-25 PROCEDURE — 93975 VASCULAR STUDY: CPT

## 2019-09-25 PROCEDURE — 76817 TRANSVAGINAL US OBSTETRIC: CPT | Mod: 26

## 2019-09-25 PROCEDURE — 85730 THROMBOPLASTIN TIME PARTIAL: CPT

## 2019-09-25 PROCEDURE — 84702 CHORIONIC GONADOTROPIN TEST: CPT

## 2019-09-25 PROCEDURE — 85027 COMPLETE CBC AUTOMATED: CPT

## 2019-09-25 PROCEDURE — 36415 COLL VENOUS BLD VENIPUNCTURE: CPT

## 2019-09-25 PROCEDURE — 76817 TRANSVAGINAL US OBSTETRIC: CPT

## 2019-09-25 RX ORDER — ONDANSETRON 8 MG/1
4 TABLET, FILM COATED ORAL ONCE
Refills: 0 | Status: COMPLETED | OUTPATIENT
Start: 2019-09-25 | End: 2019-09-25

## 2019-09-25 RX ORDER — CEPHALEXIN 500 MG
1 CAPSULE ORAL
Qty: 6 | Refills: 0
Start: 2019-09-25 | End: 2019-09-27

## 2019-09-25 RX ORDER — CEPHALEXIN 500 MG
500 CAPSULE ORAL ONCE
Refills: 0 | Status: COMPLETED | OUTPATIENT
Start: 2019-09-25 | End: 2019-09-25

## 2019-09-25 RX ADMIN — Medication 500 MILLIGRAM(S): at 20:39

## 2019-09-25 NOTE — ED ADULT TRIAGE NOTE - CHIEF COMPLAINT QUOTE
"I had my tubes tied 3 years ago and I have been having abd pain for 5 days now, I took a pregnancy test today and it was positive"; reports nausea; denies vaginal bleeding; LMP 7/12

## 2019-09-25 NOTE — ED PROVIDER NOTE - OBJECTIVE STATEMENT
28 y/o F  no PMHx, one episode of gestational DM otherwise uncomplicated prior pregnancy courses, PSHx b/l tubal ligation presents c/o suprapubic abdominal pain x 5 days. pain is constant, cramping, similar to menstrual cramping, no radiation, no palliative or provoking factors. Pain a/w nausea w/o vomiting. pt denies VB, vaginal DC, f/c, syncope, other abd pain, diarrhea, constipation, melena, hematochezia, rash, urinary sxs, recent illness. Sxs felt like prior pregnancy so did at home pregnancy test which was positive prompting pt to go to ED for evaluation. No h/o STI. 30 y/o F no PMHx, one episode of gestational DM and h/o ectopic pregnancy otherwise uncomplicated prior pregnancy courses, PSHx b/l tubal ligation presents c/o suprapubic abdominal pain x 5 days. pain is constant, cramping, similar to menstrual cramping, no radiation, no palliative or provoking factors. Pain a/w nausea w/o vomiting. pt denies VB, vaginal DC, f/c, syncope, other abd pain, diarrhea, constipation, melena, hematochezia, rash, urinary sxs, recent illness. Sxs felt like prior pregnancy so did at home pregnancy test which was positive prompting pt to go to ED for evaluation. No h/o STI. 30 y/o F , 2 prior ectopic pregnancies treated with MTX, no PMHx, PSHx b/l tubal ligation presents c/o suprapubic abdominal pain x 5 days. pain is constant, cramping, similar to menstrual cramping, no radiation, no palliative or provoking factors. Pain a/w nausea w/o vomiting. pt denies VB, vaginal DC, f/c, syncope, other abd pain, diarrhea, constipation, melena, hematochezia, rash, urinary sxs, recent illness. Sxs felt like prior pregnancy so did at home pregnancy test which was positive prompting pt to go to ED for evaluation. No h/o STI.

## 2019-09-25 NOTE — ED PROVIDER NOTE - PATIENT PORTAL LINK FT
You can access the FollowMyHealth Patient Portal offered by [facility] by registering at the following website: [email or web address]. By joining [email or web address]’s FollowMyHealth portal, you will also be able to view your health information using other applications (apps) compatible with our system.

## 2019-09-25 NOTE — ED ADULT NURSE REASSESSMENT NOTE - NS ED NURSE REASSESS COMMENT FT1
report received from day RN Balaji. Pt found resting in semi-weaver position, non-labored respirations upon return from US. Pt denies n/v or abdominal pain at this time. VS documented, medications given as per MD order. Pt waiting for results and dispo. Pt left in position of comfort, will reassess

## 2019-09-25 NOTE — ED PROVIDER NOTE - CARE PLAN
Principal Discharge DX:	Intrauterine pregnancy  Assessment and plan of treatment:	patient found with new Principal Discharge DX:	Intrauterine pregnancy  Assessment and plan of treatment:	patient found with new IUP, no ectopic or vaginal bleeding

## 2019-09-25 NOTE — ED PROVIDER NOTE - PHYSICAL EXAMINATION
GEN: Pt in NAD, A&O x3.  PSYCH: Affect appropriate.  EYES: Sclera white w/o injection.   ENT: Head NCAT. Nose w/o deformity. No auricular TTP. MMM. Neck supple FROM.   RESP: No chest wall tenderness, CTA b/l, no wheezes, rales, or rhonchi.   CARDIAC: RRR, clear distinct S1, S2, no appreciable murmurs.  ABD: Abdomen soft, non-tender. No CVAT b/l.  VASC: 2+ radial and dorsalis pedis pulses b/l. No edema or calf tenderness.  SKIN: No rashes on the trunk.

## 2019-09-25 NOTE — ED PROVIDER NOTE - RAPID ASSESSMENT
30 YO  female with no pmh,  presenting with suprapubic abdominal pain x 5 days constant, cramping, with associated malodorous vaginal discharge but no change in consistency. Abd pain improved with drinking lemon water, has not tried other medications. has + nausea  LMP july 12th, states she has took home pregnancy test today which was positive and came here for evaluation, is concerned as she "had her tubes tied" 3 yrs ago. Denies hx STIs, no constipation, diarrhea, fevers, chills, vaginal bleeding, dysuria, hematuria.

## 2019-09-25 NOTE — ED PROVIDER NOTE - NSFOLLOWUPINSTRUCTIONS_ED_ALL_ED_FT
You came in with abdominal fullness. You were found to have a viable intrauterine pregnancy. Please follow up with OB/gyn. If you experience vaginal bleeding or severe abdominal pain, please seek immediate care

## 2019-09-25 NOTE — ED ADULT NURSE NOTE - OBJECTIVE STATEMENT
HPI not completed prior to night RN arrival. Pt is a 28 y/o female a+ox3, denies pmhx, coming from home for suprapubic cramping and lower back discomfort x 5 days. Pt took home pregnancy test this morning and found a positive result; pt had her "tubes tied" 3 years ago, does not use any additional forms of birth control. Pt denies STD/STI, melena, vaginal bleeding, hematuria, abdominal pain, n/v/d, fever or chills, chest pain or discomfort, headache, lightheadedness, dizziness, difficulty breathing. IV established and labs obtained by day RN. Pt left in position of comfort, will reassess

## 2019-09-25 NOTE — ED PROVIDER NOTE - ATTENDING CONTRIBUTION TO CARE
28 YO  female with no pmh,  presenting with suprapubic abdominal pain x 5 days constant, cramping, with associated malodorous vaginal discharge abd soft, nt, pos outpt u preg test and about 7-8 weeks after her last period , no abd pain or vag bleeding, tv us with iup, ua to treat but no uti sts in setting of early pregnancy, outpt ob/gyn follow up , no rh ordered as there was no bleeding.

## 2019-09-25 NOTE — ED ADULT NURSE NOTE - NSIMPLEMENTINTERV_GEN_ALL_ED
Implemented All Universal Safety Interventions:  [place] to call system. Call bell, personal items and telephone within reach. Instruct patient to call for assistance. Room bathroom lighting operational. Non-slip footwear when patient is off stretcher. Physically safe environment: no spills, clutter or unnecessary equipment. Stretcher in lowest position, wheels locked, appropriate side rails in place.

## 2019-09-26 LAB
CULTURE RESULTS: SIGNIFICANT CHANGE UP
SPECIMEN SOURCE: SIGNIFICANT CHANGE UP

## 2019-09-27 ENCOUNTER — APPOINTMENT (OUTPATIENT)
Dept: INTERNAL MEDICINE | Facility: CLINIC | Age: 29
End: 2019-09-27

## 2019-10-02 ENCOUNTER — LABORATORY RESULT (OUTPATIENT)
Age: 29
End: 2019-10-02

## 2019-10-02 ENCOUNTER — APPOINTMENT (OUTPATIENT)
Dept: OBGYN | Facility: CLINIC | Age: 29
End: 2019-10-02
Payer: SELF-PAY

## 2019-10-02 ENCOUNTER — OUTPATIENT (OUTPATIENT)
Dept: OUTPATIENT SERVICES | Facility: HOSPITAL | Age: 29
LOS: 1 days | End: 2019-10-02
Payer: MEDICAID

## 2019-10-02 VITALS — SYSTOLIC BLOOD PRESSURE: 116 MMHG | WEIGHT: 158 LBS | BODY MASS INDEX: 27.55 KG/M2 | DIASTOLIC BLOOD PRESSURE: 80 MMHG

## 2019-10-02 DIAGNOSIS — Z98.51 TUBAL LIGATION STATUS: Chronic | ICD-10-CM

## 2019-10-02 DIAGNOSIS — Z34.80 ENCOUNTER FOR SUPERVISION OF OTHER NORMAL PREGNANCY, UNSPECIFIED TRIMESTER: ICD-10-CM

## 2019-10-02 DIAGNOSIS — Z90.722 ACQUIRED ABSENCE OF OVARIES, BILATERAL: ICD-10-CM

## 2019-10-02 PROCEDURE — 87491 CHLMYD TRACH DNA AMP PROBE: CPT

## 2019-10-02 PROCEDURE — 87624 HPV HI-RISK TYP POOLED RSLT: CPT

## 2019-10-02 PROCEDURE — 81003 URINALYSIS AUTO W/O SCOPE: CPT

## 2019-10-02 PROCEDURE — G0463: CPT

## 2019-10-02 PROCEDURE — 99214 OFFICE O/P EST MOD 30 MIN: CPT | Mod: NC,25

## 2019-10-02 PROCEDURE — 87591 N.GONORRHOEAE DNA AMP PROB: CPT

## 2019-10-02 PROCEDURE — 81025 URINE PREGNANCY TEST: CPT

## 2019-10-02 NOTE — PROCEDURE
[Cervical Pap Smear] : cervical Pap smear [GC Chlamydia Culture] : GC Chlamydia Culture [Tolerated Well] : the patient tolerated the procedure well [No Complications] : there were no complications [Intrauterine Pregnancy] : intrauterine pregnancy [Yolk Sac] : yolk sac present [Fetal Heart] : fetal heart present [Date: ___] : EDC: [unfilled] [Current GA by Sonogram: ___ (wks)] : Current GA by Sonogram: [unfilled]Uwks [WNL] : Transvaginal OB Sonogram WNL [FreeTextEntry1] : 7 weeks 4 days

## 2019-10-02 NOTE — PHYSICAL EXAM
[Awake] : awake [Alert] : alert [Acute Distress] : no acute distress [Mass] : no breast mass [Nipple Discharge] : no nipple discharge [Axillary LAD] : no axillary lymphadenopathy [Tender] : non tender [Soft] : soft [Oriented x3] : oriented to person, place, and time [No Lesions] : no genitalia lesions [No Bleeding] : there was no active vaginal bleeding [Normal] : uterus [Normal Position] : in a normal position [Tenderness] : nontender [Enlarged ___ wks] : not enlarged [Mass ___ cm] : no uterine mass was palpated [Uterine Adnexae] : were not tender and not enlarged [Adnexa Tenderness] : were not tender [Ovarian Mass (___ Cm)] : there were no adnexal masses [No Tenderness] : no rectal tenderness [Nl Sphincter Tone] : normal sphincter tone [RRR, No Murmurs] : RRR, no murmurs [CTAB] : CTAB

## 2019-10-02 NOTE — HISTORY OF PRESENT ILLNESS
[Approximately ___ (Month)] : the LMP was approximately [unfilled] month(s) ago [Normal Amount/Duration] : was of a normal amount and duration [Spotting Between  Menses] : no spotting between menses [Sexually Active] : is sexually active [Monogamous] : is monogamous [Contraception] : uses contraception [Tubal Ligation] : has had a tubal ligation

## 2019-10-03 LAB
C TRACH RRNA SPEC QL NAA+PROBE: SIGNIFICANT CHANGE UP
HPV HIGH+LOW RISK DNA PNL CVX: SIGNIFICANT CHANGE UP
N GONORRHOEA RRNA SPEC QL NAA+PROBE: SIGNIFICANT CHANGE UP
SPECIMEN SOURCE: SIGNIFICANT CHANGE UP

## 2019-10-05 LAB — CYTOLOGY SPEC DOC CYTO: SIGNIFICANT CHANGE UP

## 2019-10-17 DIAGNOSIS — Z34.90 ENCOUNTER FOR SUPERVISION OF NORMAL PREGNANCY, UNSPECIFIED, UNSPECIFIED TRIMESTER: ICD-10-CM

## 2019-10-17 DIAGNOSIS — Z90.722 ACQUIRED ABSENCE OF OVARIES, BILATERAL: ICD-10-CM

## 2019-11-06 ENCOUNTER — LABORATORY RESULT (OUTPATIENT)
Age: 29
End: 2019-11-06

## 2019-11-06 ENCOUNTER — OTHER (OUTPATIENT)
Age: 29
End: 2019-11-06

## 2019-11-06 ENCOUNTER — ASOB RESULT (OUTPATIENT)
Age: 29
End: 2019-11-06

## 2019-11-06 ENCOUNTER — OUTPATIENT (OUTPATIENT)
Dept: OUTPATIENT SERVICES | Facility: HOSPITAL | Age: 29
LOS: 1 days | End: 2019-11-06
Payer: MEDICAID

## 2019-11-06 ENCOUNTER — APPOINTMENT (OUTPATIENT)
Dept: ANTEPARTUM | Facility: CLINIC | Age: 29
End: 2019-11-06
Payer: COMMERCIAL

## 2019-11-06 ENCOUNTER — APPOINTMENT (OUTPATIENT)
Dept: OBGYN | Facility: CLINIC | Age: 29
End: 2019-11-06
Payer: MEDICAID

## 2019-11-06 VITALS
SYSTOLIC BLOOD PRESSURE: 154 MMHG | BODY MASS INDEX: 27.65 KG/M2 | DIASTOLIC BLOOD PRESSURE: 78 MMHG | HEIGHT: 63.5 IN | WEIGHT: 158 LBS

## 2019-11-06 DIAGNOSIS — Z34.90 ENCOUNTER FOR SUPERVISION OF NORMAL PREGNANCY, UNSPECIFIED, UNSPECIFIED TRIMESTER: ICD-10-CM

## 2019-11-06 DIAGNOSIS — R03.0 ELEVATED BLOOD-PRESSURE READING, W/OUT DIAGNOSIS OF HYPERTENSION: ICD-10-CM

## 2019-11-06 DIAGNOSIS — Z98.51 TUBAL LIGATION STATUS: Chronic | ICD-10-CM

## 2019-11-06 DIAGNOSIS — N76.0 ACUTE VAGINITIS: ICD-10-CM

## 2019-11-06 DIAGNOSIS — B96.89 ACUTE VAGINITIS: ICD-10-CM

## 2019-11-06 PROCEDURE — 84702 CHORIONIC GONADOTROPIN TEST: CPT

## 2019-11-06 PROCEDURE — 80053 COMPREHEN METABOLIC PANEL: CPT

## 2019-11-06 PROCEDURE — 84704 HCG FREE BETACHAIN TEST: CPT

## 2019-11-06 PROCEDURE — 86336 INHIBIN A: CPT

## 2019-11-06 PROCEDURE — 82677 ASSAY OF ESTRIOL: CPT

## 2019-11-06 PROCEDURE — 82570 ASSAY OF URINE CREATININE: CPT

## 2019-11-06 PROCEDURE — 76801 OB US < 14 WKS SINGLE FETUS: CPT | Mod: 26

## 2019-11-06 PROCEDURE — 84156 ASSAY OF PROTEIN URINE: CPT

## 2019-11-06 PROCEDURE — 85027 COMPLETE CBC AUTOMATED: CPT

## 2019-11-06 PROCEDURE — 99213 OFFICE O/P EST LOW 20 MIN: CPT

## 2019-11-06 PROCEDURE — 85610 PROTHROMBIN TIME: CPT

## 2019-11-06 PROCEDURE — 83615 LACTATE (LD) (LDH) ENZYME: CPT

## 2019-11-06 PROCEDURE — 87491 CHLMYD TRACH DNA AMP PROBE: CPT

## 2019-11-06 PROCEDURE — G0463: CPT

## 2019-11-06 PROCEDURE — 87591 N.GONORRHOEAE DNA AMP PROB: CPT

## 2019-11-06 PROCEDURE — 36416 COLLJ CAPILLARY BLOOD SPEC: CPT

## 2019-11-06 PROCEDURE — 82105 ALPHA-FETOPROTEIN SERUM: CPT

## 2019-11-06 PROCEDURE — 87800 DETECT AGNT MULT DNA DIREC: CPT

## 2019-11-06 PROCEDURE — 76813 OB US NUCHAL MEAS 1 GEST: CPT | Mod: 26

## 2019-11-06 PROCEDURE — 84550 ASSAY OF BLOOD/URIC ACID: CPT

## 2019-11-06 NOTE — PHYSICAL EXAM
[Labia Majora] : labia major [Labia Minora] : labia minora [Normal] : clitoris [No Bleeding] : there was no active vaginal bleeding [Discharge] : had a ~M discharge

## 2019-11-07 DIAGNOSIS — Z34.80 ENCOUNTER FOR SUPERVISION OF OTHER NORMAL PREGNANCY, UNSPECIFIED TRIMESTER: ICD-10-CM

## 2019-11-07 LAB
ALBUMIN SERPL ELPH-MCNC: 4.5 G/DL — SIGNIFICANT CHANGE UP (ref 3.3–5)
ALP SERPL-CCNC: 72 U/L — SIGNIFICANT CHANGE UP (ref 40–120)
ALT FLD-CCNC: 20 U/L — SIGNIFICANT CHANGE UP (ref 10–45)
ANION GAP SERPL CALC-SCNC: 15 MMOL/L — SIGNIFICANT CHANGE UP (ref 5–17)
AST SERPL-CCNC: 17 U/L — SIGNIFICANT CHANGE UP (ref 10–40)
BILIRUB SERPL-MCNC: <0.2 MG/DL — SIGNIFICANT CHANGE UP (ref 0.2–1.2)
BUN SERPL-MCNC: 8 MG/DL — SIGNIFICANT CHANGE UP (ref 7–23)
CALCIUM SERPL-MCNC: 9.5 MG/DL — SIGNIFICANT CHANGE UP (ref 8.4–10.5)
CHLORIDE SERPL-SCNC: 101 MMOL/L — SIGNIFICANT CHANGE UP (ref 96–108)
CO2 SERPL-SCNC: 20 MMOL/L — LOW (ref 22–31)
CREAT ?TM UR-MCNC: 120 MG/DL — SIGNIFICANT CHANGE UP
CREAT SERPL-MCNC: 0.46 MG/DL — LOW (ref 0.5–1.3)
GLUCOSE SERPL-MCNC: 82 MG/DL — SIGNIFICANT CHANGE UP (ref 70–99)
HCT VFR BLD CALC: 41.1 % — SIGNIFICANT CHANGE UP (ref 34.5–45)
HGB BLD-MCNC: 13 G/DL — SIGNIFICANT CHANGE UP (ref 11.5–15.5)
INR BLD: 0.96 RATIO — SIGNIFICANT CHANGE UP (ref 0.88–1.16)
LDH SERPL L TO P-CCNC: 128 U/L — SIGNIFICANT CHANGE UP (ref 50–242)
MCHC RBC-ENTMCNC: 29.4 PG — SIGNIFICANT CHANGE UP (ref 27–34)
MCHC RBC-ENTMCNC: 31.6 GM/DL — LOW (ref 32–36)
MCV RBC AUTO: 93 FL — SIGNIFICANT CHANGE UP (ref 80–100)
PLATELET # BLD AUTO: 218 K/UL — SIGNIFICANT CHANGE UP (ref 150–400)
POTASSIUM SERPL-MCNC: 4 MMOL/L — SIGNIFICANT CHANGE UP (ref 3.5–5.3)
POTASSIUM SERPL-SCNC: 4 MMOL/L — SIGNIFICANT CHANGE UP (ref 3.5–5.3)
PROT ?TM UR-MCNC: 9 MG/DL — SIGNIFICANT CHANGE UP (ref 0–12)
PROT SERPL-MCNC: 7.5 G/DL — SIGNIFICANT CHANGE UP (ref 6–8.3)
PROT/CREAT UR-RTO: 0.1 RATIO — SIGNIFICANT CHANGE UP (ref 0–0.2)
PROTHROM AB SERPL-ACNC: 10.9 SEC — SIGNIFICANT CHANGE UP (ref 10–13.1)
RBC # BLD: 4.42 M/UL — SIGNIFICANT CHANGE UP (ref 3.8–5.2)
RBC # FLD: 13.6 % — SIGNIFICANT CHANGE UP (ref 10.3–14.5)
SODIUM SERPL-SCNC: 136 MMOL/L — SIGNIFICANT CHANGE UP (ref 135–145)
URATE SERPL-MCNC: 2.9 MG/DL — SIGNIFICANT CHANGE UP (ref 2.5–7)
WBC # BLD: 8.43 K/UL — SIGNIFICANT CHANGE UP (ref 3.8–10.5)
WBC # FLD AUTO: 8.43 K/UL — SIGNIFICANT CHANGE UP (ref 3.8–10.5)

## 2019-11-08 ENCOUNTER — APPOINTMENT (OUTPATIENT)
Dept: OBGYN | Facility: CLINIC | Age: 29
End: 2019-11-08
Payer: MEDICAID

## 2019-11-08 ENCOUNTER — OUTPATIENT (OUTPATIENT)
Dept: OUTPATIENT SERVICES | Facility: HOSPITAL | Age: 29
LOS: 1 days | End: 2019-11-08
Payer: MEDICAID

## 2019-11-08 VITALS — DIASTOLIC BLOOD PRESSURE: 80 MMHG | SYSTOLIC BLOOD PRESSURE: 146 MMHG

## 2019-11-08 DIAGNOSIS — Z34.90 ENCOUNTER FOR SUPERVISION OF NORMAL PREGNANCY, UNSPECIFIED, UNSPECIFIED TRIMESTER: ICD-10-CM

## 2019-11-08 DIAGNOSIS — Z98.51 TUBAL LIGATION STATUS: Chronic | ICD-10-CM

## 2019-11-08 DIAGNOSIS — N76.0 ACUTE VAGINITIS: ICD-10-CM

## 2019-11-08 LAB
1ST TRIMESTER DATA: SIGNIFICANT CHANGE UP
ADDENDUM DOC: SIGNIFICANT CHANGE UP
AFP SERPL-ACNC: SIGNIFICANT CHANGE UP
B-HCG FREE SERPL-MCNC: SIGNIFICANT CHANGE UP
C TRACH RRNA SPEC QL NAA+PROBE: SIGNIFICANT CHANGE UP
CANDIDA AB TITR SER: SIGNIFICANT CHANGE UP
CLINICAL BIOCHEMIST REVIEW: SIGNIFICANT CHANGE UP
CLINICAL BIOCHEMIST REVIEW: SIGNIFICANT CHANGE UP
DEMOGRAPHIC DATA: SIGNIFICANT CHANGE UP
G VAGINALIS DNA SPEC QL NAA+PROBE: DETECTED
N GONORRHOEA RRNA SPEC QL NAA+PROBE: SIGNIFICANT CHANGE UP
NASAL BONE: PRESENT — SIGNIFICANT CHANGE UP
NT: SIGNIFICANT CHANGE UP
PAPP-A SERPL-ACNC: SIGNIFICANT CHANGE UP
SCREEN-FOOTER: SIGNIFICANT CHANGE UP
SCREEN-RECOMMENDATIONS: SIGNIFICANT CHANGE UP
SPECIMEN SOURCE: SIGNIFICANT CHANGE UP
T VAGINALIS SPEC QL WET PREP: SIGNIFICANT CHANGE UP

## 2019-11-08 PROCEDURE — 99213 OFFICE O/P EST LOW 20 MIN: CPT | Mod: NC

## 2019-11-08 PROCEDURE — G0463: CPT

## 2019-11-11 VITALS
HEIGHT: 63.5 IN | DIASTOLIC BLOOD PRESSURE: 78 MMHG | SYSTOLIC BLOOD PRESSURE: 154 MMHG | BODY MASS INDEX: 27.65 KG/M2 | WEIGHT: 158 LBS

## 2019-11-11 VITALS
WEIGHT: 158 LBS | BODY MASS INDEX: 27.65 KG/M2 | SYSTOLIC BLOOD PRESSURE: 154 MMHG | HEIGHT: 63.5 IN | DIASTOLIC BLOOD PRESSURE: 78 MMHG

## 2019-11-11 VITALS — DIASTOLIC BLOOD PRESSURE: 78 MMHG | BODY MASS INDEX: 27.55 KG/M2 | WEIGHT: 158 LBS | SYSTOLIC BLOOD PRESSURE: 154 MMHG

## 2019-11-15 ENCOUNTER — APPOINTMENT (OUTPATIENT)
Dept: OBGYN | Facility: CLINIC | Age: 29
End: 2019-11-15

## 2019-11-19 ENCOUNTER — LABORATORY RESULT (OUTPATIENT)
Age: 29
End: 2019-11-19

## 2019-11-19 ENCOUNTER — APPOINTMENT (OUTPATIENT)
Dept: MATERNAL FETAL MEDICINE | Facility: CLINIC | Age: 29
End: 2019-11-19
Payer: MEDICAID

## 2019-11-19 ENCOUNTER — NON-APPOINTMENT (OUTPATIENT)
Age: 29
End: 2019-11-19

## 2019-11-19 ENCOUNTER — OUTPATIENT (OUTPATIENT)
Dept: OUTPATIENT SERVICES | Facility: HOSPITAL | Age: 29
LOS: 1 days | End: 2019-11-19
Payer: MEDICAID

## 2019-11-19 VITALS
OXYGEN SATURATION: 99 % | HEART RATE: 74 BPM | HEIGHT: 63.5 IN | SYSTOLIC BLOOD PRESSURE: 130 MMHG | DIASTOLIC BLOOD PRESSURE: 80 MMHG | WEIGHT: 160.25 LBS | BODY MASS INDEX: 28.04 KG/M2

## 2019-11-19 DIAGNOSIS — O09.899 SUPERVISION OF OTHER HIGH RISK PREGNANCIES, UNSPECIFIED TRIMESTER: ICD-10-CM

## 2019-11-19 DIAGNOSIS — Z98.51 TUBAL LIGATION STATUS: Chronic | ICD-10-CM

## 2019-11-19 LAB
BILIRUB UR QL STRIP: NEGATIVE
CLARITY UR: CLEAR
COLLECTION METHOD: NORMAL
GLUCOSE UR-MCNC: NEGATIVE
HCG UR QL: 0.2 EU/DL
HGB UR QL STRIP.AUTO: NEGATIVE
KETONES UR-MCNC: NEGATIVE
LEUKOCYTE ESTERASE UR QL STRIP: NEGATIVE
NITRITE UR QL STRIP: NEGATIVE
PH UR STRIP: 7.5
PROT UR STRIP-MCNC: NEGATIVE
SP GR UR STRIP: 1.01

## 2019-11-19 PROCEDURE — 99213 OFFICE O/P EST LOW 20 MIN: CPT | Mod: TH

## 2019-11-20 ENCOUNTER — OTHER (OUTPATIENT)
Age: 29
End: 2019-11-20

## 2019-11-20 ENCOUNTER — LABORATORY RESULT (OUTPATIENT)
Age: 29
End: 2019-11-20

## 2019-11-20 LAB
ALBUMIN SERPL ELPH-MCNC: 3.9 G/DL — SIGNIFICANT CHANGE UP (ref 3.3–5)
ALP SERPL-CCNC: 62 U/L — SIGNIFICANT CHANGE UP (ref 40–120)
ALT FLD-CCNC: 24 U/L — SIGNIFICANT CHANGE UP (ref 10–45)
ANION GAP SERPL CALC-SCNC: 13 MMOL/L — SIGNIFICANT CHANGE UP (ref 5–17)
AST SERPL-CCNC: 23 U/L — SIGNIFICANT CHANGE UP (ref 10–40)
BILIRUB SERPL-MCNC: 0.2 MG/DL — SIGNIFICANT CHANGE UP (ref 0.2–1.2)
BUN SERPL-MCNC: 6 MG/DL — LOW (ref 7–23)
CALCIUM SERPL-MCNC: 8.7 MG/DL — SIGNIFICANT CHANGE UP (ref 8.4–10.5)
CHLORIDE SERPL-SCNC: 103 MMOL/L — SIGNIFICANT CHANGE UP (ref 96–108)
CO2 SERPL-SCNC: 19 MMOL/L — LOW (ref 22–31)
CREAT ?TM UR-MCNC: 45 MG/DL — SIGNIFICANT CHANGE UP
CREAT SERPL-MCNC: 0.41 MG/DL — LOW (ref 0.5–1.3)
GLUCOSE 1H P MEAL SERPL-MCNC: 158 MG/DL — HIGH (ref 70–134)
GLUCOSE SERPL-MCNC: 162 MG/DL — HIGH (ref 70–99)
HCT VFR BLD CALC: 37.8 % — SIGNIFICANT CHANGE UP (ref 34.5–45)
HGB BLD-MCNC: 12.2 G/DL — SIGNIFICANT CHANGE UP (ref 11.5–15.5)
HIV 1+2 AB+HIV1 P24 AG SERPL QL IA: SIGNIFICANT CHANGE UP
LDH SERPL L TO P-CCNC: 101 U/L — SIGNIFICANT CHANGE UP (ref 50–242)
LEAD BLD-MCNC: <1 UG/DL — SIGNIFICANT CHANGE UP (ref 0–4)
MCHC RBC-ENTMCNC: 30 PG — SIGNIFICANT CHANGE UP (ref 27–34)
MCHC RBC-ENTMCNC: 32.3 GM/DL — SIGNIFICANT CHANGE UP (ref 32–36)
MCV RBC AUTO: 93.1 FL — SIGNIFICANT CHANGE UP (ref 80–100)
MEV IGG SER-ACNC: 17.8 AU/ML — SIGNIFICANT CHANGE UP
MEV IGG+IGM SER-IMP: POSITIVE — SIGNIFICANT CHANGE UP
PLATELET # BLD AUTO: 183 K/UL — SIGNIFICANT CHANGE UP (ref 150–400)
POTASSIUM SERPL-MCNC: 3.8 MMOL/L — SIGNIFICANT CHANGE UP (ref 3.5–5.3)
POTASSIUM SERPL-SCNC: 3.8 MMOL/L — SIGNIFICANT CHANGE UP (ref 3.5–5.3)
PROT ?TM UR-MCNC: 11 MG/DL — SIGNIFICANT CHANGE UP (ref 0–12)
PROT SERPL-MCNC: 6.7 G/DL — SIGNIFICANT CHANGE UP (ref 6–8.3)
PROT/CREAT UR-RTO: 0.3 RATIO — HIGH (ref 0–0.2)
RBC # BLD: 4.06 M/UL — SIGNIFICANT CHANGE UP (ref 3.8–5.2)
RBC # FLD: 13.6 % — SIGNIFICANT CHANGE UP (ref 10.3–14.5)
RUBV IGG SER-ACNC: 9.6 INDEX — SIGNIFICANT CHANGE UP
RUBV IGG SER-IMP: POSITIVE — SIGNIFICANT CHANGE UP
SODIUM SERPL-SCNC: 135 MMOL/L — SIGNIFICANT CHANGE UP (ref 135–145)
T PALLIDUM AB TITR SER: NEGATIVE — SIGNIFICANT CHANGE UP
TSH SERPL-MCNC: 0.91 UIU/ML — SIGNIFICANT CHANGE UP (ref 0.27–4.2)
URATE SERPL-MCNC: 3.2 MG/DL — SIGNIFICANT CHANGE UP (ref 2.5–7)
VZV IGG FLD QL IA: 911.6 INDEX — SIGNIFICANT CHANGE UP
VZV IGG FLD QL IA: POSITIVE — SIGNIFICANT CHANGE UP
WBC # BLD: 6.85 K/UL — SIGNIFICANT CHANGE UP (ref 3.8–10.5)
WBC # FLD AUTO: 6.85 K/UL — SIGNIFICANT CHANGE UP (ref 3.8–10.5)

## 2019-11-21 LAB
CULTURE RESULTS: NO GROWTH — SIGNIFICANT CHANGE UP
SPECIMEN SOURCE: SIGNIFICANT CHANGE UP

## 2019-11-22 ENCOUNTER — LABORATORY RESULT (OUTPATIENT)
Age: 29
End: 2019-11-22

## 2019-11-22 PROCEDURE — 81003 URINALYSIS AUTO W/O SCOPE: CPT

## 2019-11-22 PROCEDURE — 82950 GLUCOSE TEST: CPT

## 2019-11-22 PROCEDURE — 86780 TREPONEMA PALLIDUM: CPT

## 2019-11-22 PROCEDURE — 84156 ASSAY OF PROTEIN URINE: CPT

## 2019-11-22 PROCEDURE — 84550 ASSAY OF BLOOD/URIC ACID: CPT

## 2019-11-22 PROCEDURE — 86765 RUBEOLA ANTIBODY: CPT

## 2019-11-22 PROCEDURE — G0463: CPT | Mod: 25

## 2019-11-22 PROCEDURE — 86787 VARICELLA-ZOSTER ANTIBODY: CPT

## 2019-11-22 PROCEDURE — 86762 RUBELLA ANTIBODY: CPT

## 2019-11-22 PROCEDURE — 86480 TB TEST CELL IMMUN MEASURE: CPT

## 2019-11-22 PROCEDURE — 85027 COMPLETE CBC AUTOMATED: CPT

## 2019-11-22 PROCEDURE — 82951 GLUCOSE TOLERANCE TEST (GTT): CPT

## 2019-11-22 PROCEDURE — 87086 URINE CULTURE/COLONY COUNT: CPT

## 2019-11-22 PROCEDURE — 83615 LACTATE (LD) (LDH) ENZYME: CPT

## 2019-11-22 PROCEDURE — 82570 ASSAY OF URINE CREATININE: CPT

## 2019-11-22 PROCEDURE — 86900 BLOOD TYPING SEROLOGIC ABO: CPT

## 2019-11-22 PROCEDURE — 83655 ASSAY OF LEAD: CPT

## 2019-11-22 PROCEDURE — 80053 COMPREHEN METABOLIC PANEL: CPT

## 2019-11-22 PROCEDURE — 84443 ASSAY THYROID STIM HORMONE: CPT

## 2019-11-22 PROCEDURE — 87389 HIV-1 AG W/HIV-1&-2 AB AG IA: CPT

## 2019-11-22 PROCEDURE — 86850 RBC ANTIBODY SCREEN: CPT

## 2019-11-23 LAB
GLUCOSE 1H P GLC SERPL-MCNC: 188 MG/DL — SIGNIFICANT CHANGE UP (ref 70–199)
GLUCOSE 2H P GLC SERPL-MCNC: 143 MG/DL — HIGH (ref 70–139)
GLUCOSE 3H P GLC SERPL-MCNC: 56 MG/DL — SIGNIFICANT CHANGE UP
GLUCOSE P FAST BLDV-MCNC: 87 MG/DL — SIGNIFICANT CHANGE UP (ref 70–99)

## 2019-11-26 ENCOUNTER — RESULT REVIEW (OUTPATIENT)
Age: 29
End: 2019-11-26

## 2019-12-02 ENCOUNTER — APPOINTMENT (OUTPATIENT)
Dept: MATERNAL FETAL MEDICINE | Facility: CLINIC | Age: 29
End: 2019-12-02

## 2019-12-03 ENCOUNTER — APPOINTMENT (OUTPATIENT)
Dept: ANTEPARTUM | Facility: CLINIC | Age: 29
End: 2019-12-03
Payer: MEDICAID

## 2019-12-03 ENCOUNTER — APPOINTMENT (OUTPATIENT)
Dept: MATERNAL FETAL MEDICINE | Facility: CLINIC | Age: 29
End: 2019-12-03
Payer: MEDICAID

## 2019-12-03 ENCOUNTER — LABORATORY RESULT (OUTPATIENT)
Age: 29
End: 2019-12-03

## 2019-12-03 ENCOUNTER — OUTPATIENT (OUTPATIENT)
Dept: OUTPATIENT SERVICES | Facility: HOSPITAL | Age: 29
LOS: 1 days | End: 2019-12-03
Payer: MEDICAID

## 2019-12-03 ENCOUNTER — ASOB RESULT (OUTPATIENT)
Age: 29
End: 2019-12-03

## 2019-12-03 ENCOUNTER — MED ADMIN CHARGE (OUTPATIENT)
Age: 29
End: 2019-12-03

## 2019-12-03 VITALS
SYSTOLIC BLOOD PRESSURE: 118 MMHG | BODY MASS INDEX: 28.37 KG/M2 | HEIGHT: 63.5 IN | DIASTOLIC BLOOD PRESSURE: 70 MMHG | HEART RATE: 74 BPM | WEIGHT: 162.13 LBS | OXYGEN SATURATION: 99 %

## 2019-12-03 DIAGNOSIS — O10.919 UNSPECIFIED PRE-EXISTING HYPERTENSION COMPLICATING PREGNANCY, UNSPECIFIED TRIMESTER: ICD-10-CM

## 2019-12-03 DIAGNOSIS — R51 HEADACHE: ICD-10-CM

## 2019-12-03 DIAGNOSIS — O09.219 SUPERVISION OF PREGNANCY WITH HISTORY OF PRE-TERM LABOR, UNSPECIFIED TRIMESTER: ICD-10-CM

## 2019-12-03 DIAGNOSIS — Z98.51 TUBAL LIGATION STATUS: Chronic | ICD-10-CM

## 2019-12-03 DIAGNOSIS — O09.43 SUPERVISION OF PREGNANCY WITH GRAND MULTIPARITY, THIRD TRIMESTER: ICD-10-CM

## 2019-12-03 DIAGNOSIS — O24.410 GESTATIONAL DIABETES MELLITUS IN PREGNANCY, DIET CONTROLLED: ICD-10-CM

## 2019-12-03 DIAGNOSIS — O09.899 SUPERVISION OF OTHER HIGH RISK PREGNANCIES, UNSPECIFIED TRIMESTER: ICD-10-CM

## 2019-12-03 LAB
BILIRUB UR QL STRIP: NORMAL
GLUCOSE UR-MCNC: NORMAL
HCG UR QL: 0.2 EU/DL
HGB UR QL STRIP.AUTO: NORMAL
KETONES UR-MCNC: NORMAL
LEUKOCYTE ESTERASE UR QL STRIP: NORMAL
NITRITE UR QL STRIP: NORMAL
PH UR STRIP: 7.5
PROT UR STRIP-MCNC: NORMAL
SP GR UR STRIP: 1.02

## 2019-12-03 PROCEDURE — G0108 DIAB MANAGE TRN  PER INDIV: CPT

## 2019-12-03 PROCEDURE — G0108: CPT

## 2019-12-03 PROCEDURE — 84704 HCG FREE BETACHAIN TEST: CPT

## 2019-12-03 PROCEDURE — 82677 ASSAY OF ESTRIOL: CPT

## 2019-12-03 PROCEDURE — 76805 OB US >/= 14 WKS SNGL FETUS: CPT | Mod: 26

## 2019-12-03 PROCEDURE — 99213 OFFICE O/P EST LOW 20 MIN: CPT | Mod: 25,TH

## 2019-12-03 PROCEDURE — 84702 CHORIONIC GONADOTROPIN TEST: CPT

## 2019-12-03 PROCEDURE — 76817 TRANSVAGINAL US OBSTETRIC: CPT | Mod: 26

## 2019-12-03 PROCEDURE — 81003 URINALYSIS AUTO W/O SCOPE: CPT

## 2019-12-03 PROCEDURE — G0463: CPT | Mod: 25

## 2019-12-03 PROCEDURE — 82105 ALPHA-FETOPROTEIN SERUM: CPT

## 2019-12-03 PROCEDURE — 86336 INHIBIN A: CPT

## 2019-12-04 ENCOUNTER — RESULT REVIEW (OUTPATIENT)
Age: 29
End: 2019-12-04

## 2019-12-04 ENCOUNTER — APPOINTMENT (OUTPATIENT)
Dept: ANTEPARTUM | Facility: CLINIC | Age: 29
End: 2019-12-04

## 2019-12-04 LAB
ESTIMATED AVERAGE GLUCOSE: 100 MG/DL
HBA1C MFR BLD HPLC: 5.1 %

## 2019-12-05 LAB
1ST TRIMESTER DATA: SIGNIFICANT CHANGE UP
2ND TRIMESTER DATA: SIGNIFICANT CHANGE UP
AFP SERPL-ACNC: SIGNIFICANT CHANGE UP
AFP SERPL-ACNC: SIGNIFICANT CHANGE UP
B-HCG FREE SERPL-MCNC: SIGNIFICANT CHANGE UP
B-HCG FREE SERPL-MCNC: SIGNIFICANT CHANGE UP
CLINICAL BIOCHEMIST REVIEW: SIGNIFICANT CHANGE UP
DEMOGRAPHIC DATA: SIGNIFICANT CHANGE UP
INHIBIN A SERPL-MCNC: SIGNIFICANT CHANGE UP
NASAL BONE: PRESENT — SIGNIFICANT CHANGE UP
NT: SIGNIFICANT CHANGE UP
PAPP-A SERPL-ACNC: SIGNIFICANT CHANGE UP
SCREEN-FOOTER: SIGNIFICANT CHANGE UP
U ESTRIOL SERPL-SCNC: SIGNIFICANT CHANGE UP

## 2019-12-10 ENCOUNTER — APPOINTMENT (OUTPATIENT)
Dept: MATERNAL FETAL MEDICINE | Facility: CLINIC | Age: 29
End: 2019-12-10
Payer: MEDICAID

## 2019-12-10 ENCOUNTER — LABORATORY RESULT (OUTPATIENT)
Age: 29
End: 2019-12-10

## 2019-12-10 ENCOUNTER — OUTPATIENT (OUTPATIENT)
Dept: OUTPATIENT SERVICES | Facility: HOSPITAL | Age: 29
LOS: 1 days | End: 2019-12-10
Payer: MEDICAID

## 2019-12-10 ENCOUNTER — APPOINTMENT (OUTPATIENT)
Dept: NEUROLOGY | Facility: HOSPITAL | Age: 29
End: 2019-12-10
Payer: MEDICAID

## 2019-12-10 VITALS
BODY MASS INDEX: 28.42 KG/M2 | HEART RATE: 83 BPM | DIASTOLIC BLOOD PRESSURE: 60 MMHG | SYSTOLIC BLOOD PRESSURE: 110 MMHG | HEIGHT: 63.5 IN | OXYGEN SATURATION: 98 %

## 2019-12-10 VITALS
SYSTOLIC BLOOD PRESSURE: 138 MMHG | RESPIRATION RATE: 14 BRPM | HEIGHT: 63.5 IN | HEART RATE: 96 BPM | WEIGHT: 163 LBS | BODY MASS INDEX: 28.53 KG/M2 | DIASTOLIC BLOOD PRESSURE: 83 MMHG

## 2019-12-10 DIAGNOSIS — G44.019 EPISODIC CLUSTER HEADACHE, NOT INTRACTABLE: ICD-10-CM

## 2019-12-10 DIAGNOSIS — R56.9 UNSPECIFIED CONVULSIONS: ICD-10-CM

## 2019-12-10 DIAGNOSIS — O24.410 GESTATIONAL DIABETES MELLITUS IN PREGNANCY, DIET CONTROLLED: ICD-10-CM

## 2019-12-10 DIAGNOSIS — Z98.51 TUBAL LIGATION STATUS: Chronic | ICD-10-CM

## 2019-12-10 DIAGNOSIS — O09.899 SUPERVISION OF OTHER HIGH RISK PREGNANCIES, UNSPECIFIED TRIMESTER: ICD-10-CM

## 2019-12-10 LAB
ALBUMIN SERPL ELPH-MCNC: 4 G/DL — SIGNIFICANT CHANGE UP (ref 3.3–5)
ALP SERPL-CCNC: 78 U/L — SIGNIFICANT CHANGE UP (ref 40–120)
ALT FLD-CCNC: 22 U/L — SIGNIFICANT CHANGE UP (ref 10–45)
ANION GAP SERPL CALC-SCNC: 16 MMOL/L — SIGNIFICANT CHANGE UP (ref 5–17)
AST SERPL-CCNC: 21 U/L — SIGNIFICANT CHANGE UP (ref 10–40)
BILIRUB SERPL-MCNC: 0.2 MG/DL — SIGNIFICANT CHANGE UP (ref 0.2–1.2)
BILIRUB UR QL STRIP: NEGATIVE
BUN SERPL-MCNC: 6 MG/DL — LOW (ref 7–23)
CALCIUM SERPL-MCNC: 9.1 MG/DL — SIGNIFICANT CHANGE UP (ref 8.4–10.5)
CHLORIDE SERPL-SCNC: 100 MMOL/L — SIGNIFICANT CHANGE UP (ref 96–108)
CO2 SERPL-SCNC: 20 MMOL/L — LOW (ref 22–31)
COLLECTION METHOD: NORMAL
CREAT SERPL-MCNC: 0.39 MG/DL — LOW (ref 0.5–1.3)
GLUCOSE SERPL-MCNC: 80 MG/DL — SIGNIFICANT CHANGE UP (ref 70–99)
GLUCOSE UR-MCNC: NEGATIVE
HCG UR QL: 0.2 EU/DL
HGB UR QL STRIP.AUTO: NEGATIVE
KETONES UR-MCNC: NEGATIVE
LEUKOCYTE ESTERASE UR QL STRIP: NEGATIVE
NITRITE UR QL STRIP: NEGATIVE
PH UR STRIP: 6.5
POTASSIUM SERPL-MCNC: 3.9 MMOL/L — SIGNIFICANT CHANGE UP (ref 3.5–5.3)
POTASSIUM SERPL-SCNC: 3.9 MMOL/L — SIGNIFICANT CHANGE UP (ref 3.5–5.3)
PROT SERPL-MCNC: 6.9 G/DL — SIGNIFICANT CHANGE UP (ref 6–8.3)
PROT UR STRIP-MCNC: NEGATIVE
SODIUM SERPL-SCNC: 136 MMOL/L — SIGNIFICANT CHANGE UP (ref 135–145)
SP GR UR STRIP: 1.02

## 2019-12-10 PROCEDURE — 82565 ASSAY OF CREATININE: CPT

## 2019-12-10 PROCEDURE — 20553 NJX 1/MLT TRIGGER POINTS 3/>: CPT | Mod: GC

## 2019-12-10 PROCEDURE — 82575 CREATININE CLEARANCE TEST: CPT

## 2019-12-10 PROCEDURE — G0463: CPT

## 2019-12-10 PROCEDURE — 99204 OFFICE O/P NEW MOD 45 MIN: CPT | Mod: 25,GC

## 2019-12-10 PROCEDURE — 84156 ASSAY OF PROTEIN URINE: CPT

## 2019-12-10 PROCEDURE — G0108 DIAB MANAGE TRN  PER INDIV: CPT

## 2019-12-10 PROCEDURE — 81003 URINALYSIS AUTO W/O SCOPE: CPT

## 2019-12-10 PROCEDURE — 80053 COMPREHEN METABOLIC PANEL: CPT

## 2019-12-10 PROCEDURE — 99213 OFFICE O/P EST LOW 20 MIN: CPT | Mod: GE,25,TH

## 2019-12-10 PROCEDURE — G0108: CPT

## 2019-12-10 PROCEDURE — G0463: CPT | Mod: 25

## 2019-12-10 NOTE — HISTORY OF PRESENT ILLNESS
[FreeTextEntry1] : Pt is a 30 yo F with no PMH currently 7 weeks pregnant (3 prior births, 1 prior planned ) who is referred to us for HAs that started in 2018. HAs described pressure with tingling followed by sharp (pins/needles) left head pain with left eye pain, blurriness and redness. Frequency is 8/month. No change in character/frequency with current pregnancy. Symptoms last for 3 hours. Alleviated by Tylenol and rest, laying down. Worsened by movement. Associated with dizziness (nonvertiginous), double vision (monocular on the left), drooping of the left eye, nausea and phonophobia as well as left sided nasal congestion, but not photophobia. Denies any fevers, chills, numbness, tingling, weakness, changes in speech. No family hx of headaches or personal hx of headaches earlier in life.\par \par  ID: [id number]

## 2019-12-10 NOTE — ASSESSMENT
[FreeTextEntry1] : Hx c/w cluster headache. Rare case of cluster headache in pregnancy. Often cluster improves in pregnant patients. Occipital nerve block is often helpful. Magnesium started for headache prophylaxis.

## 2019-12-10 NOTE — DISCUSSION/SUMMARY
[FreeTextEntry1] : Pt is a 30 yo F with no PMH currently 7 weeks pregnant (3 prior births, 1 prior planned ) who is referred to us for HAs that started in 2018, they are 10/10 in severity. Exam is unremarkable. Hx and exam consistent with a likely primary HA disorder, likely Cluster.\par \par Plan:\par - Start Magnesium Oxide 200mg BID\par - Today given left Occipital Nerve block\par - c/w Tylenol PRN\par - RTC for blocks and after delivery

## 2019-12-10 NOTE — PHYSICAL EXAM
[General Appearance - In No Acute Distress] : in no acute distress [General Appearance - Alert] : alert [General Appearance - Well Developed] : well developed [General Appearance - Well Nourished] : well nourished [General Appearance - Well-Appearing] : healthy appearing [Oriented To Time, Place, And Person] : oriented to person, place, and time [Affect] : the affect was normal [Mood] : the mood was normal [Memory Recent] : recent memory was not impaired [Memory Remote] : remote memory was not impaired [Cranial Nerves Optic (II)] : visual acuity intact bilaterally,  visual fields full to confrontation, pupils equal round and reactive to light [Cranial Nerves Oculomotor (III)] : extraocular motion intact [Cranial Nerves Trigeminal (V)] : facial sensation intact symmetrically [Cranial Nerves Facial (VII)] : face symmetrical [Cranial Nerves Vestibulocochlear (VIII)] : hearing was intact bilaterally [Cranial Nerves Glossopharyngeal (IX)] : tongue and palate midline [Cranial Nerves Accessory (XI - Cranial And Spinal)] : head turning and shoulder shrug symmetric [Motor Tone] : muscle tone was normal in all four extremities [Cranial Nerves Hypoglossal (XII)] : there was no tongue deviation with protrusion [Motor Strength] : muscle strength was normal in all four extremities [Involuntary Movements] : no involuntary movements were seen [No Muscle Atrophy] : normal bulk in all four extremities [Motor Handedness Right-Handed] : the patient is right hand dominant [Sensation Tactile Decrease] : light touch was intact [Proprioception] : proprioception was intact [Abnormal Walk] : normal gait [Balance] : balance was intact [2+] : Patella left 2+ [1+] : Ankle jerk left 1+ [Sclera] : the sclera and conjunctiva were normal [PERRL With Normal Accommodation] : pupils were equal in size, round, reactive to light, with normal accommodation [Extraocular Movements] : extraocular movements were intact [Optic Disc Abnormality] : the optic disc were normal in size and color [No APD] : no afferent pupillary defect [No TONY] : no internuclear ophthalmoplegia [Full Visual Field] : full visual field [Hearing Threshold Finger Rub Not [place]] : hearing was normal [Outer Ear] : the ears and nose were normal in appearance [Both Tympanic Membranes Were Examined] : both tympanic membranes were normal [Examination Of The Oral Cavity] : the lips and gums were normal [Neck Appearance] : the appearance of the neck was normal [Oropharynx] : the oropharynx was normal [] : no respiratory distress [Neck Cervical Mass (___cm)] : no neck mass was observed [Exaggerated Use Of Accessory Muscles For Inspiration] : no accessory muscle use [Paresis Pronator Drift Right-Sided] : no pronator drift on the right [Paresis Pronator Drift Left-Sided] : no pronator drift on the left [Limited Balance] : balance was intact [Tremor] : no tremor present [Past-pointing] : there was no past-pointing [Dysdiadochokinesia Bilaterally] : not present [Coordination - Dysmetria Impaired Finger-to-Nose Bilateral] : not present [Coordination - Dysmetria Impaired Heel-to-Shin Bilateral] : not present [Plantar Reflex Right Only] : normal on the right [Plantar Reflex Left Only] : normal on the left [___] : absent on the right [___] : absent on the left

## 2019-12-11 ENCOUNTER — NON-APPOINTMENT (OUTPATIENT)
Age: 29
End: 2019-12-11

## 2019-12-11 LAB
BODY SURFACE AREA CALCULATION: 1.73 M2 — SIGNIFICANT CHANGE UP
COLLECT DURATION TIME UR: 24 HR — SIGNIFICANT CHANGE UP
COLLECT DURATION TIME UR: 24 HR — SIGNIFICANT CHANGE UP
CREAT CL ?TM UR+SERPL-VRATE: 244 ML/MIN — HIGH (ref 75–115)
PROT 24H UR-MRATE: 189 MG/24 H — HIGH (ref 50–100)
TOTAL VOLUME - 24 HOUR: 1575 ML — SIGNIFICANT CHANGE UP
TOTAL VOLUME - 24 HOUR: 1575 ML — SIGNIFICANT CHANGE UP
URINE CREATININE CALCULATION: 1.4 G/24 H — SIGNIFICANT CHANGE UP (ref 0.8–1.8)
URINE CREATININE CALCULATION: 1.4 G/24 H — SIGNIFICANT CHANGE UP (ref 0.8–1.8)

## 2019-12-16 ENCOUNTER — APPOINTMENT (OUTPATIENT)
Dept: ANTEPARTUM | Facility: CLINIC | Age: 29
End: 2019-12-16
Payer: MEDICAID

## 2019-12-16 ENCOUNTER — ASOB RESULT (OUTPATIENT)
Age: 29
End: 2019-12-16

## 2019-12-16 PROCEDURE — 76815 OB US LIMITED FETUS(S): CPT | Mod: 26

## 2019-12-16 PROCEDURE — 76817 TRANSVAGINAL US OBSTETRIC: CPT | Mod: 26

## 2019-12-17 ENCOUNTER — OUTPATIENT (OUTPATIENT)
Dept: OUTPATIENT SERVICES | Facility: HOSPITAL | Age: 29
LOS: 1 days | End: 2019-12-17
Payer: MEDICAID

## 2019-12-17 DIAGNOSIS — O09.899 SUPERVISION OF OTHER HIGH RISK PREGNANCIES, UNSPECIFIED TRIMESTER: ICD-10-CM

## 2019-12-17 DIAGNOSIS — Z98.51 TUBAL LIGATION STATUS: Chronic | ICD-10-CM

## 2019-12-17 DIAGNOSIS — O24.419 GESTATIONAL DIABETES MELLITUS IN PREGNANCY, UNSPECIFIED CONTROL: ICD-10-CM

## 2019-12-17 PROCEDURE — 93005 ELECTROCARDIOGRAM TRACING: CPT

## 2019-12-24 ENCOUNTER — APPOINTMENT (OUTPATIENT)
Dept: OPHTHALMOLOGY | Facility: CLINIC | Age: 29
End: 2019-12-24

## 2019-12-27 ENCOUNTER — APPOINTMENT (OUTPATIENT)
Dept: CV DIAGNOSITCS | Facility: HOSPITAL | Age: 29
End: 2019-12-27

## 2019-12-27 ENCOUNTER — OUTPATIENT (OUTPATIENT)
Dept: OUTPATIENT SERVICES | Facility: HOSPITAL | Age: 29
LOS: 1 days | End: 2019-12-27
Payer: MEDICAID

## 2019-12-27 DIAGNOSIS — Z98.51 TUBAL LIGATION STATUS: Chronic | ICD-10-CM

## 2019-12-27 DIAGNOSIS — O09.899 SUPERVISION OF OTHER HIGH RISK PREGNANCIES, UNSPECIFIED TRIMESTER: ICD-10-CM

## 2019-12-27 DIAGNOSIS — O24.419 GESTATIONAL DIABETES MELLITUS IN PREGNANCY, UNSPECIFIED CONTROL: ICD-10-CM

## 2019-12-27 PROCEDURE — 93306 TTE W/DOPPLER COMPLETE: CPT | Mod: 26

## 2019-12-27 PROCEDURE — 93306 TTE W/DOPPLER COMPLETE: CPT

## 2020-01-07 ENCOUNTER — APPOINTMENT (OUTPATIENT)
Dept: ANTEPARTUM | Facility: CLINIC | Age: 30
End: 2020-01-07
Payer: MEDICAID

## 2020-01-07 ENCOUNTER — APPOINTMENT (OUTPATIENT)
Dept: MATERNAL FETAL MEDICINE | Facility: CLINIC | Age: 30
End: 2020-01-07
Payer: MEDICAID

## 2020-01-07 ENCOUNTER — ASOB RESULT (OUTPATIENT)
Age: 30
End: 2020-01-07

## 2020-01-07 ENCOUNTER — NON-APPOINTMENT (OUTPATIENT)
Age: 30
End: 2020-01-07

## 2020-01-07 ENCOUNTER — OUTPATIENT (OUTPATIENT)
Dept: OUTPATIENT SERVICES | Facility: HOSPITAL | Age: 30
LOS: 1 days | End: 2020-01-07
Payer: MEDICAID

## 2020-01-07 VITALS
BODY MASS INDEX: 29.57 KG/M2 | WEIGHT: 169 LBS | HEIGHT: 63.5 IN | HEART RATE: 100 BPM | SYSTOLIC BLOOD PRESSURE: 140 MMHG | DIASTOLIC BLOOD PRESSURE: 80 MMHG | OXYGEN SATURATION: 98 %

## 2020-01-07 DIAGNOSIS — Z98.51 TUBAL LIGATION STATUS: Chronic | ICD-10-CM

## 2020-01-07 DIAGNOSIS — O09.899 SUPERVISION OF OTHER HIGH RISK PREGNANCIES, UNSPECIFIED TRIMESTER: ICD-10-CM

## 2020-01-07 LAB
BILIRUB UR QL STRIP: NORMAL
GLUCOSE UR-MCNC: NORMAL
HCG UR QL: 0.2 EU/DL
HGB UR QL STRIP.AUTO: NORMAL
KETONES UR-MCNC: NORMAL
LEUKOCYTE ESTERASE UR QL STRIP: NORMAL
NITRITE UR QL STRIP: NORMAL
PH UR STRIP: 5.5
PROT UR STRIP-MCNC: NORMAL
SP GR UR STRIP: 1

## 2020-01-07 PROCEDURE — 76817 TRANSVAGINAL US OBSTETRIC: CPT

## 2020-01-07 PROCEDURE — 76811 OB US DETAILED SNGL FETUS: CPT | Mod: 26

## 2020-01-07 PROCEDURE — G0108 DIAB MANAGE TRN  PER INDIV: CPT

## 2020-01-07 PROCEDURE — 76811 OB US DETAILED SNGL FETUS: CPT

## 2020-01-07 PROCEDURE — 76817 TRANSVAGINAL US OBSTETRIC: CPT | Mod: 26

## 2020-01-07 PROCEDURE — G0108: CPT

## 2020-01-07 PROCEDURE — G0463: CPT

## 2020-01-07 PROCEDURE — 99213 OFFICE O/P EST LOW 20 MIN: CPT | Mod: GE,25,TH

## 2020-01-14 ENCOUNTER — OUTPATIENT (OUTPATIENT)
Dept: OUTPATIENT SERVICES | Facility: HOSPITAL | Age: 30
LOS: 1 days | End: 2020-01-14
Payer: MEDICAID

## 2020-01-14 ENCOUNTER — APPOINTMENT (OUTPATIENT)
Dept: NEUROLOGY | Facility: HOSPITAL | Age: 30
End: 2020-01-14

## 2020-01-14 VITALS
HEIGHT: 63.5 IN | RESPIRATION RATE: 16 BRPM | BODY MASS INDEX: 29.57 KG/M2 | DIASTOLIC BLOOD PRESSURE: 102 MMHG | SYSTOLIC BLOOD PRESSURE: 168 MMHG | WEIGHT: 169 LBS | HEART RATE: 120 BPM

## 2020-01-14 DIAGNOSIS — R56.9 UNSPECIFIED CONVULSIONS: ICD-10-CM

## 2020-01-14 DIAGNOSIS — Z98.51 TUBAL LIGATION STATUS: Chronic | ICD-10-CM

## 2020-01-14 DIAGNOSIS — R51 HEADACHE: ICD-10-CM

## 2020-01-14 PROCEDURE — G0463: CPT

## 2020-01-14 NOTE — PHYSICAL EXAM
[General Appearance - Alert] : alert [General Appearance - In No Acute Distress] : in no acute distress [General Appearance - Well Developed] : well developed [General Appearance - Well Nourished] : well nourished [General Appearance - Well-Appearing] : healthy appearing [Mood] : the mood was normal [Affect] : the affect was normal [Oriented To Time, Place, And Person] : oriented to person, place, and time [Memory Recent] : recent memory was not impaired [Memory Remote] : remote memory was not impaired [Cranial Nerves Oculomotor (III)] : extraocular motion intact [Cranial Nerves Optic (II)] : visual acuity intact bilaterally,  visual fields full to confrontation, pupils equal round and reactive to light [Cranial Nerves Trigeminal (V)] : facial sensation intact symmetrically [Cranial Nerves Facial (VII)] : face symmetrical [Cranial Nerves Accessory (XI - Cranial And Spinal)] : head turning and shoulder shrug symmetric [Cranial Nerves Vestibulocochlear (VIII)] : hearing was intact bilaterally [Cranial Nerves Glossopharyngeal (IX)] : tongue and palate midline [Cranial Nerves Hypoglossal (XII)] : there was no tongue deviation with protrusion [Motor Tone] : muscle tone was normal in all four extremities [Motor Strength] : muscle strength was normal in all four extremities [No Muscle Atrophy] : normal bulk in all four extremities [Involuntary Movements] : no involuntary movements were seen [Motor Handedness Right-Handed] : the patient is right hand dominant [Abnormal Walk] : normal gait [Balance] : balance was intact [Proprioception] : proprioception was intact [Sensation Tactile Decrease] : light touch was intact [2+] : Patella left 2+ [1+] : Ankle jerk right 1+ [PERRL With Normal Accommodation] : pupils were equal in size, round, reactive to light, with normal accommodation [Sclera] : the sclera and conjunctiva were normal [Extraocular Movements] : extraocular movements were intact [Optic Disc Abnormality] : the optic disc were normal in size and color [No TONY] : no internuclear ophthalmoplegia [No APD] : no afferent pupillary defect [Full Visual Field] : full visual field [Hearing Threshold Finger Rub Not [place]] : hearing was normal [Outer Ear] : the ears and nose were normal in appearance [Both Tympanic Membranes Were Examined] : both tympanic membranes were normal [Oropharynx] : the oropharynx was normal [Examination Of The Oral Cavity] : the lips and gums were normal [Neck Appearance] : the appearance of the neck was normal [] : the neck was supple [Neck Cervical Mass (___cm)] : no neck mass was observed [Exaggerated Use Of Accessory Muscles For Inspiration] : no accessory muscle use [Paresis Pronator Drift Right-Sided] : no pronator drift on the right [Paresis Pronator Drift Left-Sided] : no pronator drift on the left [Limited Balance] : balance was intact [Past-pointing] : there was no past-pointing [Tremor] : no tremor present [Dysdiadochokinesia Bilaterally] : not present [Coordination - Dysmetria Impaired Finger-to-Nose Bilateral] : not present [Coordination - Dysmetria Impaired Heel-to-Shin Bilateral] : not present [Plantar Reflex Right Only] : normal on the right [Plantar Reflex Left Only] : normal on the left [___] : absent on the right [___] : absent on the left

## 2020-01-14 NOTE — DISCUSSION/SUMMARY
[FreeTextEntry1] : 28 yo  woman 22 weeks into gestation who presents to [facility] neurology clinic for follow up regarding working diagnosis of cluster headaches. Patient was started on Magnesium Oxide 200mg BID, continued on oral Tylenol PRN and given an in office left occipital nerve block in December 10 2019. Headaches have since decreased in frequency (8x monthly -> 2x monthly), but no change in character, associated symptoms (stress/activity aggregate, rest/ Tylenol alleviate; dizziness (nonvertiginous), double vision (monocular on the left), drooping of the left eye, nausea and phonophobia as well as left sided nasal congestion, but not photophobia). Patient's most recent episode was on the , lasted 2 days, before completely resolving.\par \par Exam: overall stable \par \par Plan:\par - switch Magnesium Oxide to 400mg qdaily \par - c/w Tylenol PRN\par -return to clinic as needed, but especially after delivery. \par \par \par Plan discussed w/ attending

## 2020-01-14 NOTE — HISTORY OF PRESENT ILLNESS
[FreeTextEntry1] : F/U 19: \par 28 yo  woman 22 weeks into gestation who presents to [facility] neurology clinic for follow up regarding working diagnosis of cluster headaches. Patient was started on Magnesium Oxide 200mg BID, continued on oral Tylenol PRN and given an in office left occipital nerve block in December 10 2019. Headaches have since decreased in frequency (8x monthly -> 2x monthly), but no change in character, associated symptoms (stress/activity aggregate, rest/ Tylenol alleviate; dizziness (nonvertiginous), double vision (monocular on the left), drooping of the left eye, nausea and phonophobia as well as left sided nasal congestion, but not photophobia). Patient's most recent episode was on the , lasted 2 days, before completely resolving. No complications with her pregnancy thus far. Patient is asymptomatic at this time. \par __________\par Pt is a 28 yo F with no PMH currently 7 weeks pregnant (3 prior births, 1 prior planned ) who is referred to us for HAs that started in 2018. HAs described pressure with tingling followed by sharp (pins/needles) left head pain with left eye pain, blurriness and redness. Frequency is 8/month. No change in character/frequency with current pregnancy. Symptoms last for 3 hours. Alleviated by Tylenol and rest, laying down. Worsened by movement. Associated with dizziness (nonvertiginous), double vision (monocular on the left), drooping of the left eye, nausea and phonophobia as well as left sided nasal congestion, but not photophobia. Denies any fevers, chills, numbness, tingling, weakness, changes in speech. No family hx of headaches or personal hx of headaches earlier in life.\par \par  ID: [id number]

## 2020-01-21 ENCOUNTER — APPOINTMENT (OUTPATIENT)
Dept: ANTEPARTUM | Facility: CLINIC | Age: 30
End: 2020-01-21
Payer: MEDICAID

## 2020-01-21 ENCOUNTER — ASOB RESULT (OUTPATIENT)
Age: 30
End: 2020-01-21

## 2020-01-21 ENCOUNTER — APPOINTMENT (OUTPATIENT)
Dept: MATERNAL FETAL MEDICINE | Facility: CLINIC | Age: 30
End: 2020-01-21
Payer: MEDICAID

## 2020-01-21 ENCOUNTER — OUTPATIENT (OUTPATIENT)
Dept: OUTPATIENT SERVICES | Facility: HOSPITAL | Age: 30
LOS: 1 days | End: 2020-01-21
Payer: MEDICAID

## 2020-01-21 VITALS
WEIGHT: 171 LBS | HEART RATE: 90 BPM | SYSTOLIC BLOOD PRESSURE: 110 MMHG | HEIGHT: 63.5 IN | OXYGEN SATURATION: 99 % | DIASTOLIC BLOOD PRESSURE: 62 MMHG | BODY MASS INDEX: 29.92 KG/M2

## 2020-01-21 DIAGNOSIS — O35.8XX0 MATERNAL CARE FOR OTHER (SUSPECTED) FETAL ABNORMALITY AND DAMAGE, NOT APPLICABLE OR UNSPECIFIED: ICD-10-CM

## 2020-01-21 DIAGNOSIS — Z3A.21 21 WEEKS GESTATION OF PREGNANCY: ICD-10-CM

## 2020-01-21 DIAGNOSIS — O24.419 GESTATIONAL DIABETES MELLITUS IN PREGNANCY, UNSPECIFIED CONTROL: ICD-10-CM

## 2020-01-21 DIAGNOSIS — O09.292 SUPERVISION OF PREGNANCY WITH OTHER POOR REPRODUCTIVE OR OBSTETRIC HISTORY, SECOND TRIMESTER: ICD-10-CM

## 2020-01-21 DIAGNOSIS — O09.899 SUPERVISION OF OTHER HIGH RISK PREGNANCIES, UNSPECIFIED TRIMESTER: ICD-10-CM

## 2020-01-21 DIAGNOSIS — Z98.51 TUBAL LIGATION STATUS: Chronic | ICD-10-CM

## 2020-01-21 DIAGNOSIS — Z36.86 ENCOUNTER FOR ANTENATAL SCREENING FOR CERVICAL LENGTH: ICD-10-CM

## 2020-01-21 LAB
BILIRUB UR QL STRIP: NORMAL
GLUCOSE UR-MCNC: NORMAL
HCG UR QL: 0.2 EU/DL
HGB UR QL STRIP.AUTO: NORMAL
KETONES UR-MCNC: NORMAL
LEUKOCYTE ESTERASE UR QL STRIP: NORMAL
NITRITE UR QL STRIP: NORMAL
PH UR STRIP: 7
PROT UR STRIP-MCNC: NORMAL
SP GR UR STRIP: 1.02

## 2020-01-21 PROCEDURE — 76816 OB US FOLLOW-UP PER FETUS: CPT | Mod: 26

## 2020-01-21 PROCEDURE — 76816 OB US FOLLOW-UP PER FETUS: CPT

## 2020-01-21 PROCEDURE — 81003 URINALYSIS AUTO W/O SCOPE: CPT

## 2020-01-21 PROCEDURE — G0463: CPT

## 2020-01-21 PROCEDURE — 99213 OFFICE O/P EST LOW 20 MIN: CPT | Mod: 25,TH

## 2020-01-27 DIAGNOSIS — O24.410 GESTATIONAL DIABETES MELLITUS IN PREGNANCY, DIET CONTROLLED: ICD-10-CM

## 2020-01-27 DIAGNOSIS — O09.90 SUPERVISION OF HIGH RISK PREGNANCY, UNSPECIFIED, UNSPECIFIED TRIMESTER: ICD-10-CM

## 2020-01-27 DIAGNOSIS — O10.919 UNSPECIFIED PRE-EXISTING HYPERTENSION COMPLICATING PREGNANCY, UNSPECIFIED TRIMESTER: ICD-10-CM

## 2020-01-27 DIAGNOSIS — G44.019 EPISODIC CLUSTER HEADACHE, NOT INTRACTABLE: ICD-10-CM

## 2020-02-04 ENCOUNTER — LABORATORY RESULT (OUTPATIENT)
Age: 30
End: 2020-02-04

## 2020-02-04 ENCOUNTER — APPOINTMENT (OUTPATIENT)
Dept: ANTEPARTUM | Facility: CLINIC | Age: 30
End: 2020-02-04
Payer: MEDICAID

## 2020-02-04 ENCOUNTER — OUTPATIENT (OUTPATIENT)
Dept: OUTPATIENT SERVICES | Facility: HOSPITAL | Age: 30
LOS: 1 days | End: 2020-02-04
Payer: MEDICAID

## 2020-02-04 ENCOUNTER — ASOB RESULT (OUTPATIENT)
Age: 30
End: 2020-02-04

## 2020-02-04 ENCOUNTER — APPOINTMENT (OUTPATIENT)
Dept: MATERNAL FETAL MEDICINE | Facility: CLINIC | Age: 30
End: 2020-02-04
Payer: MEDICAID

## 2020-02-04 ENCOUNTER — APPOINTMENT (OUTPATIENT)
Dept: MATERNAL FETAL MEDICINE | Facility: CLINIC | Age: 30
End: 2020-02-04

## 2020-02-04 VITALS
DIASTOLIC BLOOD PRESSURE: 80 MMHG | OXYGEN SATURATION: 98 % | BODY MASS INDEX: 29.75 KG/M2 | SYSTOLIC BLOOD PRESSURE: 124 MMHG | WEIGHT: 170 LBS | HEART RATE: 103 BPM | HEIGHT: 63.5 IN

## 2020-02-04 DIAGNOSIS — Z98.51 TUBAL LIGATION STATUS: Chronic | ICD-10-CM

## 2020-02-04 DIAGNOSIS — O09.899 SUPERVISION OF OTHER HIGH RISK PREGNANCIES, UNSPECIFIED TRIMESTER: ICD-10-CM

## 2020-02-04 LAB
BILIRUB UR QL STRIP: NORMAL
GLUCOSE BLDC GLUCOMTR-MCNC: 86
GLUCOSE UR-MCNC: NORMAL
HCG UR QL: 0.2 EU/DL
HGB UR QL STRIP.AUTO: NORMAL
KETONES UR-MCNC: NORMAL
LEUKOCYTE ESTERASE UR QL STRIP: NORMAL
NITRITE UR QL STRIP: NORMAL
PH UR STRIP: 7
PROT UR STRIP-MCNC: NORMAL
SP GR UR STRIP: 1.01

## 2020-02-04 PROCEDURE — G0108: CPT

## 2020-02-04 PROCEDURE — G0463: CPT

## 2020-02-04 PROCEDURE — 99213 OFFICE O/P EST LOW 20 MIN: CPT | Mod: 25,TH

## 2020-02-04 PROCEDURE — 82985 ASSAY OF GLYCATED PROTEIN: CPT

## 2020-02-04 PROCEDURE — 76816 OB US FOLLOW-UP PER FETUS: CPT | Mod: 26

## 2020-02-04 PROCEDURE — 81003 URINALYSIS AUTO W/O SCOPE: CPT

## 2020-02-04 PROCEDURE — 86480 TB TEST CELL IMMUN MEASURE: CPT

## 2020-02-04 PROCEDURE — 76816 OB US FOLLOW-UP PER FETUS: CPT

## 2020-02-04 PROCEDURE — 85027 COMPLETE CBC AUTOMATED: CPT

## 2020-02-04 PROCEDURE — G0108 DIAB MANAGE TRN  PER INDIV: CPT

## 2020-02-04 PROCEDURE — 76817 TRANSVAGINAL US OBSTETRIC: CPT

## 2020-02-04 PROCEDURE — 86850 RBC ANTIBODY SCREEN: CPT

## 2020-02-04 PROCEDURE — 86900 BLOOD TYPING SEROLOGIC ABO: CPT

## 2020-02-04 PROCEDURE — 76817 TRANSVAGINAL US OBSTETRIC: CPT | Mod: 26

## 2020-02-05 LAB
FRUCTOSAMINE SERPL-MCNC: 214 UMOL/L — SIGNIFICANT CHANGE UP (ref 205–285)
HCT VFR BLD CALC: 37.1 % — SIGNIFICANT CHANGE UP (ref 34.5–45)
HGB BLD-MCNC: 11.7 G/DL — SIGNIFICANT CHANGE UP (ref 11.5–15.5)
MCHC RBC-ENTMCNC: 29 PG — SIGNIFICANT CHANGE UP (ref 27–34)
MCHC RBC-ENTMCNC: 31.5 GM/DL — LOW (ref 32–36)
MCV RBC AUTO: 92.1 FL — SIGNIFICANT CHANGE UP (ref 80–100)
PLATELET # BLD AUTO: 191 K/UL — SIGNIFICANT CHANGE UP (ref 150–400)
RBC # BLD: 4.03 M/UL — SIGNIFICANT CHANGE UP (ref 3.8–5.2)
RBC # FLD: 13.1 % — SIGNIFICANT CHANGE UP (ref 10.3–14.5)
WBC # BLD: 5.88 K/UL — SIGNIFICANT CHANGE UP (ref 3.8–10.5)
WBC # FLD AUTO: 5.88 K/UL — SIGNIFICANT CHANGE UP (ref 3.8–10.5)

## 2020-02-07 LAB
GAMMA INTERFERON BACKGROUND BLD IA-ACNC: 0.13 IU/ML — SIGNIFICANT CHANGE UP
M TB IFN-G BLD-IMP: NEGATIVE — SIGNIFICANT CHANGE UP
M TB IFN-G CD4+ BCKGRND COR BLD-ACNC: -0.02 IU/ML — SIGNIFICANT CHANGE UP
M TB IFN-G CD4+CD8+ BCKGRND COR BLD-ACNC: -0.01 IU/ML — SIGNIFICANT CHANGE UP
QUANT TB PLUS MITOGEN MINUS NIL: 6.52 IU/ML — SIGNIFICANT CHANGE UP

## 2020-02-10 DIAGNOSIS — O09.292 SUPERVISION OF PREGNANCY WITH OTHER POOR REPRODUCTIVE OR OBSTETRIC HISTORY, SECOND TRIMESTER: ICD-10-CM

## 2020-02-10 DIAGNOSIS — Z36.86 ENCOUNTER FOR ANTENATAL SCREENING FOR CERVICAL LENGTH: ICD-10-CM

## 2020-02-10 DIAGNOSIS — Z36.4 ENCOUNTER FOR ANTENATAL SCREENING FOR FETAL GROWTH RETARDATION: ICD-10-CM

## 2020-02-10 DIAGNOSIS — Z3A.25 25 WEEKS GESTATION OF PREGNANCY: ICD-10-CM

## 2020-02-11 ENCOUNTER — APPOINTMENT (OUTPATIENT)
Dept: MATERNAL FETAL MEDICINE | Facility: CLINIC | Age: 30
End: 2020-02-11

## 2020-02-11 ENCOUNTER — APPOINTMENT (OUTPATIENT)
Dept: MATERNAL FETAL MEDICINE | Facility: CLINIC | Age: 30
End: 2020-02-11
Payer: MEDICAID

## 2020-02-11 ENCOUNTER — OUTPATIENT (OUTPATIENT)
Dept: OUTPATIENT SERVICES | Facility: HOSPITAL | Age: 30
LOS: 1 days | End: 2020-02-11
Payer: MEDICAID

## 2020-02-11 VITALS
OXYGEN SATURATION: 98 % | BODY MASS INDEX: 30.1 KG/M2 | HEIGHT: 63.5 IN | WEIGHT: 172 LBS | SYSTOLIC BLOOD PRESSURE: 130 MMHG | HEART RATE: 95 BPM | DIASTOLIC BLOOD PRESSURE: 62 MMHG

## 2020-02-11 DIAGNOSIS — O09.899 SUPERVISION OF OTHER HIGH RISK PREGNANCIES, UNSPECIFIED TRIMESTER: ICD-10-CM

## 2020-02-11 DIAGNOSIS — Z98.51 TUBAL LIGATION STATUS: Chronic | ICD-10-CM

## 2020-02-11 PROCEDURE — G0108 DIAB MANAGE TRN  PER INDIV: CPT

## 2020-02-11 PROCEDURE — 99213 OFFICE O/P EST LOW 20 MIN: CPT | Mod: GE,25,TH

## 2020-02-11 PROCEDURE — G0463: CPT

## 2020-02-11 PROCEDURE — 81003 URINALYSIS AUTO W/O SCOPE: CPT

## 2020-02-11 PROCEDURE — G0108: CPT

## 2020-02-21 DIAGNOSIS — I10 ESSENTIAL (PRIMARY) HYPERTENSION: ICD-10-CM

## 2020-02-21 DIAGNOSIS — O24.415 GESTATIONAL DIABETES MELLITUS IN PREGNANCY, CONTROLLED BY ORAL HYPOGLYCEMIC DRUGS: ICD-10-CM

## 2020-02-21 DIAGNOSIS — O10.919 UNSPECIFIED PRE-EXISTING HYPERTENSION COMPLICATING PREGNANCY, UNSPECIFIED TRIMESTER: ICD-10-CM

## 2020-02-25 ENCOUNTER — APPOINTMENT (OUTPATIENT)
Dept: MATERNAL FETAL MEDICINE | Facility: CLINIC | Age: 30
End: 2020-02-25

## 2020-02-25 ENCOUNTER — APPOINTMENT (OUTPATIENT)
Dept: MATERNAL FETAL MEDICINE | Facility: CLINIC | Age: 30
End: 2020-02-25
Payer: MEDICAID

## 2020-02-25 ENCOUNTER — APPOINTMENT (OUTPATIENT)
Dept: ANTEPARTUM | Facility: CLINIC | Age: 30
End: 2020-02-25
Payer: MEDICAID

## 2020-02-25 ENCOUNTER — OUTPATIENT (OUTPATIENT)
Dept: OUTPATIENT SERVICES | Facility: HOSPITAL | Age: 30
LOS: 1 days | End: 2020-02-25
Payer: MEDICAID

## 2020-02-25 ENCOUNTER — ASOB RESULT (OUTPATIENT)
Age: 30
End: 2020-02-25

## 2020-02-25 ENCOUNTER — NON-APPOINTMENT (OUTPATIENT)
Age: 30
End: 2020-02-25

## 2020-02-25 VITALS
SYSTOLIC BLOOD PRESSURE: 120 MMHG | OXYGEN SATURATION: 98 % | BODY MASS INDEX: 29.57 KG/M2 | HEIGHT: 63.5 IN | HEART RATE: 98 BPM | DIASTOLIC BLOOD PRESSURE: 68 MMHG | WEIGHT: 169 LBS

## 2020-02-25 DIAGNOSIS — O09.899 SUPERVISION OF OTHER HIGH RISK PREGNANCIES, UNSPECIFIED TRIMESTER: ICD-10-CM

## 2020-02-25 DIAGNOSIS — Z98.51 TUBAL LIGATION STATUS: Chronic | ICD-10-CM

## 2020-02-25 PROCEDURE — 76816 OB US FOLLOW-UP PER FETUS: CPT

## 2020-02-25 PROCEDURE — 76816 OB US FOLLOW-UP PER FETUS: CPT | Mod: 26

## 2020-02-25 PROCEDURE — 90471 IMMUNIZATION ADMIN: CPT | Mod: NC

## 2020-02-25 PROCEDURE — 99213 OFFICE O/P EST LOW 20 MIN: CPT | Mod: 25,GE,TH

## 2020-02-25 PROCEDURE — 90715 TDAP VACCINE 7 YRS/> IM: CPT

## 2020-02-25 PROCEDURE — 81003 URINALYSIS AUTO W/O SCOPE: CPT

## 2020-02-25 PROCEDURE — G0108: CPT

## 2020-02-25 PROCEDURE — G0108 DIAB MANAGE TRN  PER INDIV: CPT

## 2020-02-25 PROCEDURE — G0463: CPT

## 2020-02-25 PROCEDURE — 90471 IMMUNIZATION ADMIN: CPT

## 2020-02-26 LAB
BILIRUB UR QL STRIP: NORMAL
GLUCOSE UR-MCNC: NORMAL
HCG UR QL: 4 EU/DL
HGB UR QL STRIP.AUTO: NORMAL
KETONES UR-MCNC: NORMAL
LEUKOCYTE ESTERASE UR QL STRIP: NORMAL
NITRITE UR QL STRIP: NORMAL
PH UR STRIP: 6.5
PROT UR STRIP-MCNC: NORMAL
SP GR UR STRIP: 1.02

## 2020-02-27 ENCOUNTER — NON-APPOINTMENT (OUTPATIENT)
Age: 30
End: 2020-02-27

## 2020-03-02 DIAGNOSIS — Z36.4 ENCOUNTER FOR ANTENATAL SCREENING FOR FETAL GROWTH RETARDATION: ICD-10-CM

## 2020-03-02 DIAGNOSIS — Z3A.28 28 WEEKS GESTATION OF PREGNANCY: ICD-10-CM

## 2020-03-02 DIAGNOSIS — O09.292 SUPERVISION OF PREGNANCY WITH OTHER POOR REPRODUCTIVE OR OBSTETRIC HISTORY, SECOND TRIMESTER: ICD-10-CM

## 2020-03-03 ENCOUNTER — LABORATORY RESULT (OUTPATIENT)
Age: 30
End: 2020-03-03

## 2020-03-03 ENCOUNTER — OUTPATIENT (OUTPATIENT)
Dept: OUTPATIENT SERVICES | Facility: HOSPITAL | Age: 30
LOS: 1 days | End: 2020-03-03
Payer: MEDICAID

## 2020-03-03 ENCOUNTER — APPOINTMENT (OUTPATIENT)
Dept: MATERNAL FETAL MEDICINE | Facility: CLINIC | Age: 30
End: 2020-03-03

## 2020-03-03 ENCOUNTER — APPOINTMENT (OUTPATIENT)
Dept: MATERNAL FETAL MEDICINE | Facility: CLINIC | Age: 30
End: 2020-03-03
Payer: MEDICAID

## 2020-03-03 ENCOUNTER — NON-APPOINTMENT (OUTPATIENT)
Age: 30
End: 2020-03-03

## 2020-03-03 VITALS
DIASTOLIC BLOOD PRESSURE: 70 MMHG | WEIGHT: 169 LBS | OXYGEN SATURATION: 98 % | HEART RATE: 98 BPM | BODY MASS INDEX: 29.57 KG/M2 | HEIGHT: 63.5 IN | SYSTOLIC BLOOD PRESSURE: 142 MMHG

## 2020-03-03 DIAGNOSIS — Z86.19 PERSONAL HISTORY OF OTHER INFECTIOUS AND PARASITIC DISEASES: ICD-10-CM

## 2020-03-03 DIAGNOSIS — O09.899 SUPERVISION OF OTHER HIGH RISK PREGNANCIES, UNSPECIFIED TRIMESTER: ICD-10-CM

## 2020-03-03 DIAGNOSIS — Z98.51 TUBAL LIGATION STATUS: Chronic | ICD-10-CM

## 2020-03-03 DIAGNOSIS — L29.9 PRURITUS, UNSPECIFIED: ICD-10-CM

## 2020-03-03 LAB
BILIRUB UR QL STRIP: NORMAL
GLUCOSE UR-MCNC: 100
HCG UR QL: 1 EU/DL
HCT VFR BLD CALC: 35.6 % — SIGNIFICANT CHANGE UP (ref 34.5–45)
HGB BLD-MCNC: 11.6 G/DL — SIGNIFICANT CHANGE UP (ref 11.5–15.5)
HGB UR QL STRIP.AUTO: NORMAL
KETONES UR-MCNC: NORMAL
LEUKOCYTE ESTERASE UR QL STRIP: NORMAL
MCHC RBC-ENTMCNC: 28.8 PG — SIGNIFICANT CHANGE UP (ref 27–34)
MCHC RBC-ENTMCNC: 32.6 GM/DL — SIGNIFICANT CHANGE UP (ref 32–36)
MCV RBC AUTO: 88.3 FL — SIGNIFICANT CHANGE UP (ref 80–100)
NITRITE UR QL STRIP: NORMAL
PH UR STRIP: 6.5
PLATELET # BLD AUTO: 225 K/UL — SIGNIFICANT CHANGE UP (ref 150–400)
PROT UR STRIP-MCNC: NORMAL
RBC # BLD: 4.03 M/UL — SIGNIFICANT CHANGE UP (ref 3.8–5.2)
RBC # FLD: 13.3 % — SIGNIFICANT CHANGE UP (ref 10.3–14.5)
SP GR UR STRIP: 1.02
WBC # BLD: 5.52 K/UL — SIGNIFICANT CHANGE UP (ref 3.8–10.5)
WBC # FLD AUTO: 5.52 K/UL — SIGNIFICANT CHANGE UP (ref 3.8–10.5)

## 2020-03-03 PROCEDURE — 86900 BLOOD TYPING SEROLOGIC ABO: CPT

## 2020-03-03 PROCEDURE — 81003 URINALYSIS AUTO W/O SCOPE: CPT

## 2020-03-03 PROCEDURE — 99213 OFFICE O/P EST LOW 20 MIN: CPT | Mod: 25,TH

## 2020-03-03 PROCEDURE — G0463: CPT

## 2020-03-03 PROCEDURE — 82239 BILE ACIDS TOTAL: CPT

## 2020-03-03 PROCEDURE — 85027 COMPLETE CBC AUTOMATED: CPT

## 2020-03-03 PROCEDURE — 80053 COMPREHEN METABOLIC PANEL: CPT

## 2020-03-03 PROCEDURE — 36415 COLL VENOUS BLD VENIPUNCTURE: CPT

## 2020-03-03 PROCEDURE — 86850 RBC ANTIBODY SCREEN: CPT

## 2020-03-04 LAB
ALBUMIN SERPL ELPH-MCNC: 3.7 G/DL — SIGNIFICANT CHANGE UP (ref 3.3–5)
ALP SERPL-CCNC: 189 U/L — HIGH (ref 40–120)
ALT FLD-CCNC: 126 U/L — HIGH (ref 10–45)
ANION GAP SERPL CALC-SCNC: 15 MMOL/L — SIGNIFICANT CHANGE UP (ref 5–17)
AST SERPL-CCNC: 76 U/L — HIGH (ref 10–40)
BILIRUB SERPL-MCNC: 0.6 MG/DL — SIGNIFICANT CHANGE UP (ref 0.2–1.2)
BUN SERPL-MCNC: 6 MG/DL — LOW (ref 7–23)
CALCIUM SERPL-MCNC: 8.7 MG/DL — SIGNIFICANT CHANGE UP (ref 8.4–10.5)
CHLORIDE SERPL-SCNC: 103 MMOL/L — SIGNIFICANT CHANGE UP (ref 96–108)
CO2 SERPL-SCNC: 18 MMOL/L — LOW (ref 22–31)
CREAT SERPL-MCNC: 0.38 MG/DL — LOW (ref 0.5–1.3)
GLUCOSE SERPL-MCNC: 82 MG/DL — SIGNIFICANT CHANGE UP (ref 70–99)
POTASSIUM SERPL-MCNC: 4.1 MMOL/L — SIGNIFICANT CHANGE UP (ref 3.5–5.3)
POTASSIUM SERPL-SCNC: 4.1 MMOL/L — SIGNIFICANT CHANGE UP (ref 3.5–5.3)
PROT SERPL-MCNC: 6.4 G/DL — SIGNIFICANT CHANGE UP (ref 6–8.3)
SODIUM SERPL-SCNC: 136 MMOL/L — SIGNIFICANT CHANGE UP (ref 135–145)

## 2020-03-05 ENCOUNTER — APPOINTMENT (OUTPATIENT)
Dept: ANTEPARTUM | Facility: CLINIC | Age: 30
End: 2020-03-05
Payer: MEDICAID

## 2020-03-05 ENCOUNTER — ASOB RESULT (OUTPATIENT)
Age: 30
End: 2020-03-05

## 2020-03-05 LAB — BILE AC FLD-MCNC: 100.3 UMOL/L — HIGH (ref 0–10)

## 2020-03-05 PROCEDURE — 76818 FETAL BIOPHYS PROFILE W/NST: CPT | Mod: 26

## 2020-03-09 DIAGNOSIS — L29.9 PRURITUS, UNSPECIFIED: ICD-10-CM

## 2020-03-09 DIAGNOSIS — O09.93 SUPERVISION OF HIGH RISK PREGNANCY, UNSPECIFIED, THIRD TRIMESTER: ICD-10-CM

## 2020-03-09 DIAGNOSIS — O10.919 UNSPECIFIED PRE-EXISTING HYPERTENSION COMPLICATING PREGNANCY, UNSPECIFIED TRIMESTER: ICD-10-CM

## 2020-03-09 DIAGNOSIS — Z23 ENCOUNTER FOR IMMUNIZATION: ICD-10-CM

## 2020-03-09 DIAGNOSIS — O24.415 GESTATIONAL DIABETES MELLITUS IN PREGNANCY, CONTROLLED BY ORAL HYPOGLYCEMIC DRUGS: ICD-10-CM

## 2020-03-09 DIAGNOSIS — O09.43 SUPERVISION OF PREGNANCY WITH GRAND MULTIPARITY, THIRD TRIMESTER: ICD-10-CM

## 2020-03-10 ENCOUNTER — ASOB RESULT (OUTPATIENT)
Age: 30
End: 2020-03-10

## 2020-03-10 ENCOUNTER — OUTPATIENT (OUTPATIENT)
Dept: OUTPATIENT SERVICES | Facility: HOSPITAL | Age: 30
LOS: 1 days | End: 2020-03-10
Payer: MEDICAID

## 2020-03-10 ENCOUNTER — APPOINTMENT (OUTPATIENT)
Dept: MATERNAL FETAL MEDICINE | Facility: CLINIC | Age: 30
End: 2020-03-10

## 2020-03-10 ENCOUNTER — NON-APPOINTMENT (OUTPATIENT)
Age: 30
End: 2020-03-10

## 2020-03-10 ENCOUNTER — APPOINTMENT (OUTPATIENT)
Dept: MATERNAL FETAL MEDICINE | Facility: CLINIC | Age: 30
End: 2020-03-10
Payer: MEDICAID

## 2020-03-10 ENCOUNTER — APPOINTMENT (OUTPATIENT)
Dept: ANTEPARTUM | Facility: CLINIC | Age: 30
End: 2020-03-10
Payer: MEDICAID

## 2020-03-10 VITALS
BODY MASS INDEX: 29.79 KG/M2 | HEART RATE: 78 BPM | HEIGHT: 63.5 IN | OXYGEN SATURATION: 98 % | WEIGHT: 170.25 LBS | DIASTOLIC BLOOD PRESSURE: 68 MMHG | SYSTOLIC BLOOD PRESSURE: 118 MMHG

## 2020-03-10 DIAGNOSIS — O09.899 SUPERVISION OF OTHER HIGH RISK PREGNANCIES, UNSPECIFIED TRIMESTER: ICD-10-CM

## 2020-03-10 DIAGNOSIS — Z98.51 TUBAL LIGATION STATUS: Chronic | ICD-10-CM

## 2020-03-10 LAB
BILIRUB UR QL STRIP: NORMAL
GLUCOSE UR-MCNC: 100
HCG UR QL: 0.2 EU/DL
HGB UR QL STRIP.AUTO: NORMAL
KETONES UR-MCNC: NORMAL
LEUKOCYTE ESTERASE UR QL STRIP: NORMAL
NITRITE UR QL STRIP: NORMAL
PH UR STRIP: 6.5
PROT UR STRIP-MCNC: NORMAL
SP GR UR STRIP: 1.01

## 2020-03-10 PROCEDURE — 81003 URINALYSIS AUTO W/O SCOPE: CPT

## 2020-03-10 PROCEDURE — 76818 FETAL BIOPHYS PROFILE W/NST: CPT | Mod: 26

## 2020-03-10 PROCEDURE — 99213 OFFICE O/P EST LOW 20 MIN: CPT | Mod: 25,TH

## 2020-03-10 PROCEDURE — G0108: CPT

## 2020-03-10 PROCEDURE — G0463: CPT

## 2020-03-10 PROCEDURE — G0108 DIAB MANAGE TRN  PER INDIV: CPT

## 2020-03-10 PROCEDURE — 76818 FETAL BIOPHYS PROFILE W/NST: CPT

## 2020-03-10 RX ORDER — URSODIOL 300 MG/1
300 CAPSULE ORAL
Qty: 60 | Refills: 1 | Status: COMPLETED | COMMUNITY
Start: 2020-03-03 | End: 2020-03-10

## 2020-03-10 RX ORDER — URSODIOL 500 MG/1
500 TABLET ORAL
Qty: 90 | Refills: 1 | Status: COMPLETED | COMMUNITY
Start: 2020-03-10 | End: 2020-05-09

## 2020-03-13 ENCOUNTER — APPOINTMENT (OUTPATIENT)
Dept: MATERNAL FETAL MEDICINE | Facility: CLINIC | Age: 30
End: 2020-03-13
Payer: MEDICAID

## 2020-03-13 ENCOUNTER — OUTPATIENT (OUTPATIENT)
Dept: OUTPATIENT SERVICES | Facility: HOSPITAL | Age: 30
LOS: 1 days | End: 2020-03-13
Payer: MEDICAID

## 2020-03-13 ENCOUNTER — APPOINTMENT (OUTPATIENT)
Dept: ANTEPARTUM | Facility: CLINIC | Age: 30
End: 2020-03-13
Payer: MEDICAID

## 2020-03-13 ENCOUNTER — ASOB RESULT (OUTPATIENT)
Age: 30
End: 2020-03-13

## 2020-03-13 DIAGNOSIS — Z98.51 TUBAL LIGATION STATUS: Chronic | ICD-10-CM

## 2020-03-13 PROCEDURE — G0108 DIAB MANAGE TRN  PER INDIV: CPT

## 2020-03-13 PROCEDURE — 59025 FETAL NON-STRESS TEST: CPT | Mod: 26

## 2020-03-13 PROCEDURE — 59025 FETAL NON-STRESS TEST: CPT

## 2020-03-16 ENCOUNTER — APPOINTMENT (OUTPATIENT)
Dept: ANTEPARTUM | Facility: CLINIC | Age: 30
End: 2020-03-16

## 2020-03-17 ENCOUNTER — ASOB RESULT (OUTPATIENT)
Age: 30
End: 2020-03-17

## 2020-03-17 ENCOUNTER — APPOINTMENT (OUTPATIENT)
Dept: MATERNAL FETAL MEDICINE | Facility: CLINIC | Age: 30
End: 2020-03-17
Payer: MEDICAID

## 2020-03-17 ENCOUNTER — OUTPATIENT (OUTPATIENT)
Dept: OUTPATIENT SERVICES | Facility: HOSPITAL | Age: 30
LOS: 1 days | End: 2020-03-17
Payer: MEDICAID

## 2020-03-17 ENCOUNTER — APPOINTMENT (OUTPATIENT)
Dept: ANTEPARTUM | Facility: CLINIC | Age: 30
End: 2020-03-17
Payer: MEDICAID

## 2020-03-17 VITALS
BODY MASS INDEX: 29.92 KG/M2 | WEIGHT: 171 LBS | SYSTOLIC BLOOD PRESSURE: 100 MMHG | HEART RATE: 86 BPM | DIASTOLIC BLOOD PRESSURE: 54 MMHG | OXYGEN SATURATION: 98 % | HEIGHT: 63.5 IN

## 2020-03-17 DIAGNOSIS — O26.613 LIVER AND BILIARY TRACT DISORDERS IN PREGNANCY, THIRD TRIMESTER: ICD-10-CM

## 2020-03-17 DIAGNOSIS — O24.415 GESTATIONAL DIABETES MELLITUS IN PREGNANCY, CONTROLLED BY ORAL HYPOGLYCEMIC DRUGS: ICD-10-CM

## 2020-03-17 DIAGNOSIS — Z3A.30 30 WEEKS GESTATION OF PREGNANCY: ICD-10-CM

## 2020-03-17 DIAGNOSIS — Z98.51 TUBAL LIGATION STATUS: Chronic | ICD-10-CM

## 2020-03-17 DIAGNOSIS — O09.899 SUPERVISION OF OTHER HIGH RISK PREGNANCIES, UNSPECIFIED TRIMESTER: ICD-10-CM

## 2020-03-17 LAB
BILIRUB UR QL STRIP: NORMAL
GLUCOSE UR-MCNC: NORMAL
HCG UR QL: 0.2 EU/DL
HGB UR QL STRIP.AUTO: NORMAL
KETONES UR-MCNC: NORMAL
LEUKOCYTE ESTERASE UR QL STRIP: NORMAL
NITRITE UR QL STRIP: NORMAL
PH UR STRIP: 7
PROT UR STRIP-MCNC: NORMAL
SP GR UR STRIP: 1.01

## 2020-03-17 PROCEDURE — 76818 FETAL BIOPHYS PROFILE W/NST: CPT | Mod: 26

## 2020-03-17 PROCEDURE — G0463: CPT

## 2020-03-17 PROCEDURE — 81003 URINALYSIS AUTO W/O SCOPE: CPT

## 2020-03-17 PROCEDURE — 99213 OFFICE O/P EST LOW 20 MIN: CPT | Mod: 25,TH

## 2020-03-17 PROCEDURE — 76818 FETAL BIOPHYS PROFILE W/NST: CPT

## 2020-03-18 ENCOUNTER — NON-APPOINTMENT (OUTPATIENT)
Age: 30
End: 2020-03-18

## 2020-03-20 ENCOUNTER — OUTPATIENT (OUTPATIENT)
Dept: OUTPATIENT SERVICES | Facility: HOSPITAL | Age: 30
LOS: 1 days | End: 2020-03-20
Payer: MEDICAID

## 2020-03-20 ENCOUNTER — APPOINTMENT (OUTPATIENT)
Dept: ANTEPARTUM | Facility: CLINIC | Age: 30
End: 2020-03-20
Payer: MEDICAID

## 2020-03-20 ENCOUNTER — ASOB RESULT (OUTPATIENT)
Age: 30
End: 2020-03-20

## 2020-03-20 DIAGNOSIS — Z98.51 TUBAL LIGATION STATUS: Chronic | ICD-10-CM

## 2020-03-20 PROCEDURE — 59025 FETAL NON-STRESS TEST: CPT

## 2020-03-23 DIAGNOSIS — O26.613 LIVER AND BILIARY TRACT DISORDERS IN PREGNANCY, THIRD TRIMESTER: ICD-10-CM

## 2020-03-23 DIAGNOSIS — O24.415 GESTATIONAL DIABETES MELLITUS IN PREGNANCY, CONTROLLED BY ORAL HYPOGLYCEMIC DRUGS: ICD-10-CM

## 2020-03-23 DIAGNOSIS — Z3A.30 30 WEEKS GESTATION OF PREGNANCY: ICD-10-CM

## 2020-03-23 DIAGNOSIS — Z3A.31 31 WEEKS GESTATION OF PREGNANCY: ICD-10-CM

## 2020-03-23 DIAGNOSIS — O09.43 SUPERVISION OF PREGNANCY WITH GRAND MULTIPARITY, THIRD TRIMESTER: ICD-10-CM

## 2020-03-24 ENCOUNTER — APPOINTMENT (OUTPATIENT)
Dept: ANTEPARTUM | Facility: CLINIC | Age: 30
End: 2020-03-24
Payer: MEDICAID

## 2020-03-24 ENCOUNTER — APPOINTMENT (OUTPATIENT)
Dept: MATERNAL FETAL MEDICINE | Facility: CLINIC | Age: 30
End: 2020-03-24

## 2020-03-24 ENCOUNTER — ASOB RESULT (OUTPATIENT)
Age: 30
End: 2020-03-24

## 2020-03-24 PROCEDURE — 76818 FETAL BIOPHYS PROFILE W/NST: CPT | Mod: 26

## 2020-03-24 PROCEDURE — 76816 OB US FOLLOW-UP PER FETUS: CPT | Mod: 26

## 2020-03-25 ENCOUNTER — APPOINTMENT (OUTPATIENT)
Dept: MATERNAL FETAL MEDICINE | Facility: CLINIC | Age: 30
End: 2020-03-25
Payer: MEDICAID

## 2020-03-25 PROCEDURE — 99442: CPT

## 2020-03-25 PROCEDURE — 98967 PH1 ASSMT&MGMT NQHP 11-20: CPT

## 2020-03-31 ENCOUNTER — APPOINTMENT (OUTPATIENT)
Dept: ANTEPARTUM | Facility: CLINIC | Age: 30
End: 2020-03-31
Payer: MEDICAID

## 2020-03-31 ENCOUNTER — APPOINTMENT (OUTPATIENT)
Dept: MATERNAL FETAL MEDICINE | Facility: CLINIC | Age: 30
End: 2020-03-31

## 2020-03-31 ENCOUNTER — ASOB RESULT (OUTPATIENT)
Age: 30
End: 2020-03-31

## 2020-03-31 PROCEDURE — 76818 FETAL BIOPHYS PROFILE W/NST: CPT | Mod: 26

## 2020-04-01 ENCOUNTER — OUTPATIENT (OUTPATIENT)
Dept: OUTPATIENT SERVICES | Facility: HOSPITAL | Age: 30
LOS: 1 days | End: 2020-04-01
Payer: MEDICAID

## 2020-04-01 DIAGNOSIS — Z98.51 TUBAL LIGATION STATUS: Chronic | ICD-10-CM

## 2020-04-01 PROCEDURE — G9001: CPT

## 2020-04-03 ENCOUNTER — ASOB RESULT (OUTPATIENT)
Age: 30
End: 2020-04-03

## 2020-04-03 ENCOUNTER — APPOINTMENT (OUTPATIENT)
Dept: ANTEPARTUM | Facility: CLINIC | Age: 30
End: 2020-04-03
Payer: MEDICAID

## 2020-04-03 PROCEDURE — 76819 FETAL BIOPHYS PROFIL W/O NST: CPT

## 2020-04-06 ENCOUNTER — APPOINTMENT (OUTPATIENT)
Dept: MATERNAL FETAL MEDICINE | Facility: CLINIC | Age: 30
End: 2020-04-06
Payer: MEDICAID

## 2020-04-06 PROCEDURE — 98967 PH1 ASSMT&MGMT NQHP 11-20: CPT

## 2020-04-06 PROCEDURE — 99442: CPT

## 2020-04-07 ENCOUNTER — APPOINTMENT (OUTPATIENT)
Dept: ANTEPARTUM | Facility: CLINIC | Age: 30
End: 2020-04-07
Payer: MEDICAID

## 2020-04-07 ENCOUNTER — NON-APPOINTMENT (OUTPATIENT)
Age: 30
End: 2020-04-07

## 2020-04-07 ENCOUNTER — APPOINTMENT (OUTPATIENT)
Dept: ANTEPARTUM | Facility: CLINIC | Age: 30
End: 2020-04-07

## 2020-04-07 ENCOUNTER — OUTPATIENT (OUTPATIENT)
Dept: OUTPATIENT SERVICES | Facility: HOSPITAL | Age: 30
LOS: 1 days | End: 2020-04-07
Payer: MEDICAID

## 2020-04-07 ENCOUNTER — ASOB RESULT (OUTPATIENT)
Age: 30
End: 2020-04-07

## 2020-04-07 ENCOUNTER — LABORATORY RESULT (OUTPATIENT)
Age: 30
End: 2020-04-07

## 2020-04-07 ENCOUNTER — APPOINTMENT (OUTPATIENT)
Dept: MATERNAL FETAL MEDICINE | Facility: CLINIC | Age: 30
End: 2020-04-07
Payer: MEDICAID

## 2020-04-07 VITALS
SYSTOLIC BLOOD PRESSURE: 100 MMHG | WEIGHT: 168 LBS | OXYGEN SATURATION: 100 % | HEART RATE: 98 BPM | DIASTOLIC BLOOD PRESSURE: 68 MMHG | BODY MASS INDEX: 29.29 KG/M2

## 2020-04-07 DIAGNOSIS — O09.899 SUPERVISION OF OTHER HIGH RISK PREGNANCIES, UNSPECIFIED TRIMESTER: ICD-10-CM

## 2020-04-07 DIAGNOSIS — Z98.51 TUBAL LIGATION STATUS: Chronic | ICD-10-CM

## 2020-04-07 LAB
BILIRUB UR QL STRIP: NORMAL
CLARITY UR: CLEAR
COLLECTION METHOD: NORMAL
GLUCOSE UR-MCNC: NORMAL
HCG UR QL: 0.2 EU/DL
HGB UR QL STRIP.AUTO: NORMAL
KETONES UR-MCNC: NORMAL
LEUKOCYTE ESTERASE UR QL STRIP: NORMAL
NITRITE UR QL STRIP: NORMAL
PH UR STRIP: 5.5
PROT UR STRIP-MCNC: NORMAL
SP GR UR STRIP: 1.02

## 2020-04-07 PROCEDURE — G0463: CPT

## 2020-04-07 PROCEDURE — 85027 COMPLETE CBC AUTOMATED: CPT

## 2020-04-07 PROCEDURE — 87389 HIV-1 AG W/HIV-1&-2 AB AG IA: CPT

## 2020-04-07 PROCEDURE — 76818 FETAL BIOPHYS PROFILE W/NST: CPT | Mod: 26

## 2020-04-07 PROCEDURE — 86780 TREPONEMA PALLIDUM: CPT

## 2020-04-07 PROCEDURE — 81003 URINALYSIS AUTO W/O SCOPE: CPT

## 2020-04-07 PROCEDURE — 87081 CULTURE SCREEN ONLY: CPT

## 2020-04-07 PROCEDURE — 99213 OFFICE O/P EST LOW 20 MIN: CPT | Mod: GE,25,TH

## 2020-04-08 ENCOUNTER — NON-APPOINTMENT (OUTPATIENT)
Age: 30
End: 2020-04-08

## 2020-04-08 ENCOUNTER — LABORATORY RESULT (OUTPATIENT)
Age: 30
End: 2020-04-08

## 2020-04-08 LAB
HCT VFR BLD CALC: 39 % — SIGNIFICANT CHANGE UP (ref 34.5–45)
HGB BLD-MCNC: 11.8 G/DL — SIGNIFICANT CHANGE UP (ref 11.5–15.5)
HIV 1+2 AB+HIV1 P24 AG SERPL QL IA: SIGNIFICANT CHANGE UP
MCHC RBC-ENTMCNC: 26.9 PG — LOW (ref 27–34)
MCHC RBC-ENTMCNC: 30.3 GM/DL — LOW (ref 32–36)
MCV RBC AUTO: 89 FL — SIGNIFICANT CHANGE UP (ref 80–100)
PLATELET # BLD AUTO: 212 K/UL — SIGNIFICANT CHANGE UP (ref 150–400)
RBC # BLD: 4.38 M/UL — SIGNIFICANT CHANGE UP (ref 3.8–5.2)
RBC # FLD: 14.1 % — SIGNIFICANT CHANGE UP (ref 10.3–14.5)
T PALLIDUM AB TITR SER: NEGATIVE — SIGNIFICANT CHANGE UP
WBC # BLD: 5.79 K/UL — SIGNIFICANT CHANGE UP (ref 3.8–10.5)
WBC # FLD AUTO: 5.79 K/UL — SIGNIFICANT CHANGE UP (ref 3.8–10.5)

## 2020-04-10 ENCOUNTER — ASOB RESULT (OUTPATIENT)
Age: 30
End: 2020-04-10

## 2020-04-10 ENCOUNTER — APPOINTMENT (OUTPATIENT)
Dept: ANTEPARTUM | Facility: CLINIC | Age: 30
End: 2020-04-10
Payer: MEDICAID

## 2020-04-10 PROCEDURE — 76819 FETAL BIOPHYS PROFIL W/O NST: CPT | Mod: 26

## 2020-04-11 LAB
CULTURE RESULTS: SIGNIFICANT CHANGE UP
SPECIMEN SOURCE: SIGNIFICANT CHANGE UP

## 2020-04-14 ENCOUNTER — OUTPATIENT (OUTPATIENT)
Dept: OUTPATIENT SERVICES | Facility: HOSPITAL | Age: 30
LOS: 1 days | End: 2020-04-14
Payer: MEDICAID

## 2020-04-14 ENCOUNTER — APPOINTMENT (OUTPATIENT)
Dept: ANTEPARTUM | Facility: CLINIC | Age: 30
End: 2020-04-14
Payer: MEDICAID

## 2020-04-14 ENCOUNTER — NON-APPOINTMENT (OUTPATIENT)
Age: 30
End: 2020-04-14

## 2020-04-14 ENCOUNTER — ASOB RESULT (OUTPATIENT)
Age: 30
End: 2020-04-14

## 2020-04-14 ENCOUNTER — APPOINTMENT (OUTPATIENT)
Dept: MATERNAL FETAL MEDICINE | Facility: CLINIC | Age: 30
End: 2020-04-14
Payer: MEDICAID

## 2020-04-14 VITALS
HEART RATE: 98 BPM | SYSTOLIC BLOOD PRESSURE: 120 MMHG | BODY MASS INDEX: 28.66 KG/M2 | HEIGHT: 65 IN | OXYGEN SATURATION: 98 % | DIASTOLIC BLOOD PRESSURE: 78 MMHG | WEIGHT: 172 LBS

## 2020-04-14 DIAGNOSIS — Z98.51 TUBAL LIGATION STATUS: Chronic | ICD-10-CM

## 2020-04-14 DIAGNOSIS — O09.899 SUPERVISION OF OTHER HIGH RISK PREGNANCIES, UNSPECIFIED TRIMESTER: ICD-10-CM

## 2020-04-14 DIAGNOSIS — O24.415 GESTATIONAL DIABETES MELLITUS IN PREGNANCY, CONTROLLED BY ORAL HYPOGLYCEMIC DRUGS: ICD-10-CM

## 2020-04-14 DIAGNOSIS — Z3A.31 31 WEEKS GESTATION OF PREGNANCY: ICD-10-CM

## 2020-04-14 DIAGNOSIS — O26.613 LIVER AND BILIARY TRACT DISORDERS IN PREGNANCY, THIRD TRIMESTER: ICD-10-CM

## 2020-04-14 LAB
BILIRUB UR QL STRIP: NORMAL
CLARITY UR: CLEAR
COLLECTION METHOD: NORMAL
GLUCOSE UR-MCNC: NORMAL
HCG UR QL: 0.2 EU/DL
HGB UR QL STRIP.AUTO: NORMAL
KETONES UR-MCNC: NORMAL
LEUKOCYTE ESTERASE UR QL STRIP: NORMAL
NITRITE UR QL STRIP: NORMAL
PH UR STRIP: 5.5
PROT UR STRIP-MCNC: NORMAL
SP GR UR STRIP: 1.06

## 2020-04-14 PROCEDURE — G0463: CPT

## 2020-04-14 PROCEDURE — 76818 FETAL BIOPHYS PROFILE W/NST: CPT

## 2020-04-14 PROCEDURE — 76818 FETAL BIOPHYS PROFILE W/NST: CPT | Mod: 26

## 2020-04-14 PROCEDURE — 81003 URINALYSIS AUTO W/O SCOPE: CPT

## 2020-04-14 PROCEDURE — 99213 OFFICE O/P EST LOW 20 MIN: CPT | Mod: TH,25

## 2020-04-16 ENCOUNTER — NON-APPOINTMENT (OUTPATIENT)
Age: 30
End: 2020-04-16

## 2020-04-16 DIAGNOSIS — O10.919 UNSPECIFIED PRE-EXISTING HYPERTENSION COMPLICATING PREGNANCY, UNSPECIFIED TRIMESTER: ICD-10-CM

## 2020-04-17 ENCOUNTER — APPOINTMENT (OUTPATIENT)
Dept: ANTEPARTUM | Facility: CLINIC | Age: 30
End: 2020-04-17
Payer: MEDICAID

## 2020-04-17 ENCOUNTER — ASOB RESULT (OUTPATIENT)
Age: 30
End: 2020-04-17

## 2020-04-17 PROCEDURE — 76819 FETAL BIOPHYS PROFIL W/O NST: CPT | Mod: 26

## 2020-04-20 ENCOUNTER — INPATIENT (INPATIENT)
Facility: HOSPITAL | Age: 30
LOS: 2 days | Discharge: HOME | DRG: 998 | End: 2020-04-23
Attending: OBSTETRICS & GYNECOLOGY | Admitting: OBSTETRICS & GYNECOLOGY
Payer: MEDICAID

## 2020-04-20 VITALS — HEIGHT: 65 IN | WEIGHT: 171.96 LBS

## 2020-04-20 DIAGNOSIS — Z98.51 TUBAL LIGATION STATUS: Chronic | ICD-10-CM

## 2020-04-20 DIAGNOSIS — O26.613 LIVER AND BILIARY TRACT DISORDERS IN PREGNANCY, THIRD TRIMESTER: ICD-10-CM

## 2020-04-20 LAB
BASOPHILS # BLD AUTO: 0.03 K/UL — SIGNIFICANT CHANGE UP (ref 0–0.2)
BASOPHILS NFR BLD AUTO: 0.4 % — SIGNIFICANT CHANGE UP (ref 0–2)
EOSINOPHIL # BLD AUTO: 0.06 K/UL — SIGNIFICANT CHANGE UP (ref 0–0.5)
EOSINOPHIL NFR BLD AUTO: 0.8 % — SIGNIFICANT CHANGE UP (ref 0–6)
GLUCOSE BLDC GLUCOMTR-MCNC: 90 MG/DL — SIGNIFICANT CHANGE UP (ref 70–99)
HCT VFR BLD CALC: 36 % — SIGNIFICANT CHANGE UP (ref 34.5–45)
HGB BLD-MCNC: 11.3 G/DL — LOW (ref 11.5–15.5)
IMM GRANULOCYTES NFR BLD AUTO: 2 % — HIGH (ref 0–1.5)
LYMPHOCYTES # BLD AUTO: 1.9 K/UL — SIGNIFICANT CHANGE UP (ref 1–3.3)
LYMPHOCYTES # BLD AUTO: 25.4 % — SIGNIFICANT CHANGE UP (ref 13–44)
MCHC RBC-ENTMCNC: 27.2 PG — SIGNIFICANT CHANGE UP (ref 27–34)
MCHC RBC-ENTMCNC: 31.4 GM/DL — LOW (ref 32–36)
MCV RBC AUTO: 86.5 FL — SIGNIFICANT CHANGE UP (ref 80–100)
MONOCYTES # BLD AUTO: 0.54 K/UL — SIGNIFICANT CHANGE UP (ref 0–0.9)
MONOCYTES NFR BLD AUTO: 7.2 % — SIGNIFICANT CHANGE UP (ref 2–14)
NEUTROPHILS # BLD AUTO: 4.81 K/UL — SIGNIFICANT CHANGE UP (ref 1.8–7.4)
NEUTROPHILS NFR BLD AUTO: 64.2 % — SIGNIFICANT CHANGE UP (ref 43–77)
NRBC # BLD: 0 /100 WBCS — SIGNIFICANT CHANGE UP (ref 0–0)
PLATELET # BLD AUTO: 181 K/UL — SIGNIFICANT CHANGE UP (ref 150–400)
RBC # BLD: 4.16 M/UL — SIGNIFICANT CHANGE UP (ref 3.8–5.2)
RBC # FLD: 14.6 % — HIGH (ref 10.3–14.5)
WBC # BLD: 7.49 K/UL — SIGNIFICANT CHANGE UP (ref 3.8–10.5)
WBC # FLD AUTO: 7.49 K/UL — SIGNIFICANT CHANGE UP (ref 3.8–10.5)

## 2020-04-20 RX ORDER — CITRIC ACID/SODIUM CITRATE 300-500 MG
15 SOLUTION, ORAL ORAL EVERY 6 HOURS
Refills: 0 | Status: DISCONTINUED | OUTPATIENT
Start: 2020-04-20 | End: 2020-04-21

## 2020-04-20 RX ORDER — OXYTOCIN 10 UNIT/ML
333.33 VIAL (ML) INJECTION
Qty: 20 | Refills: 0 | Status: DISCONTINUED | OUTPATIENT
Start: 2020-04-20 | End: 2020-04-21

## 2020-04-20 RX ORDER — SODIUM CHLORIDE 9 MG/ML
1000 INJECTION INTRAMUSCULAR; INTRAVENOUS; SUBCUTANEOUS
Refills: 0 | Status: DISCONTINUED | OUTPATIENT
Start: 2020-04-20 | End: 2020-04-21

## 2020-04-20 RX ORDER — SODIUM CHLORIDE 9 MG/ML
1000 INJECTION, SOLUTION INTRAVENOUS
Refills: 0 | Status: DISCONTINUED | OUTPATIENT
Start: 2020-04-20 | End: 2020-04-21

## 2020-04-20 RX ADMIN — SODIUM CHLORIDE 125 MILLILITER(S): 9 INJECTION INTRAMUSCULAR; INTRAVENOUS; SUBCUTANEOUS at 23:45

## 2020-04-20 RX ADMIN — SODIUM CHLORIDE 125 MILLILITER(S): 9 INJECTION, SOLUTION INTRAVENOUS at 23:45

## 2020-04-21 ENCOUNTER — APPOINTMENT (OUTPATIENT)
Dept: ANTEPARTUM | Facility: CLINIC | Age: 30
End: 2020-04-21

## 2020-04-21 ENCOUNTER — APPOINTMENT (OUTPATIENT)
Dept: MATERNAL FETAL MEDICINE | Facility: CLINIC | Age: 30
End: 2020-04-21

## 2020-04-21 DIAGNOSIS — O24.415 GESTATIONAL DIABETES MELLITUS IN PREGNANCY, CONTROLLED BY ORAL HYPOGLYCEMIC DRUGS: ICD-10-CM

## 2020-04-21 DIAGNOSIS — Z3A.35 35 WEEKS GESTATION OF PREGNANCY: ICD-10-CM

## 2020-04-21 LAB
ALBUMIN SERPL ELPH-MCNC: 3.8 G/DL — SIGNIFICANT CHANGE UP (ref 3.3–5)
ALP SERPL-CCNC: 163 U/L — HIGH (ref 40–120)
ALT FLD-CCNC: 34 U/L — SIGNIFICANT CHANGE UP (ref 10–45)
ANION GAP SERPL CALC-SCNC: 16 MMOL/L — SIGNIFICANT CHANGE UP (ref 5–17)
APPEARANCE UR: ABNORMAL
APTT BLD: 25 SEC — LOW (ref 27.5–36.3)
AST SERPL-CCNC: 31 U/L — SIGNIFICANT CHANGE UP (ref 10–40)
BILIRUB SERPL-MCNC: 0.3 MG/DL — SIGNIFICANT CHANGE UP (ref 0.2–1.2)
BILIRUB UR-MCNC: NEGATIVE — SIGNIFICANT CHANGE UP
BLD GP AB SCN SERPL QL: NEGATIVE — SIGNIFICANT CHANGE UP
BUN SERPL-MCNC: 6 MG/DL — LOW (ref 7–23)
CALCIUM SERPL-MCNC: 9 MG/DL — SIGNIFICANT CHANGE UP (ref 8.4–10.5)
CHLORIDE SERPL-SCNC: 102 MMOL/L — SIGNIFICANT CHANGE UP (ref 96–108)
CO2 SERPL-SCNC: 19 MMOL/L — LOW (ref 22–31)
COLOR SPEC: YELLOW — SIGNIFICANT CHANGE UP
CREAT ?TM UR-MCNC: 134 MG/DL — SIGNIFICANT CHANGE UP
CREAT SERPL-MCNC: 0.41 MG/DL — LOW (ref 0.5–1.3)
DIFF PNL FLD: NEGATIVE — SIGNIFICANT CHANGE UP
FIBRINOGEN PPP-MCNC: 679 MG/DL — HIGH (ref 350–510)
GLUCOSE BLDC GLUCOMTR-MCNC: 102 MG/DL — HIGH (ref 70–99)
GLUCOSE BLDC GLUCOMTR-MCNC: 82 MG/DL — SIGNIFICANT CHANGE UP (ref 70–99)
GLUCOSE BLDC GLUCOMTR-MCNC: 93 MG/DL — SIGNIFICANT CHANGE UP (ref 70–99)
GLUCOSE SERPL-MCNC: 86 MG/DL — SIGNIFICANT CHANGE UP (ref 70–99)
GLUCOSE UR QL: NEGATIVE — SIGNIFICANT CHANGE UP
HBV SURFACE AG SERPL QL IA: SIGNIFICANT CHANGE UP
INR BLD: 0.97 RATIO — SIGNIFICANT CHANGE UP (ref 0.88–1.16)
KETONES UR-MCNC: NEGATIVE — SIGNIFICANT CHANGE UP
LDH SERPL L TO P-CCNC: 120 U/L — SIGNIFICANT CHANGE UP (ref 50–242)
LEUKOCYTE ESTERASE UR-ACNC: ABNORMAL
NITRITE UR-MCNC: NEGATIVE — SIGNIFICANT CHANGE UP
PH UR: 6.5 — SIGNIFICANT CHANGE UP (ref 5–8)
POTASSIUM SERPL-MCNC: 3.9 MMOL/L — SIGNIFICANT CHANGE UP (ref 3.5–5.3)
POTASSIUM SERPL-SCNC: 3.9 MMOL/L — SIGNIFICANT CHANGE UP (ref 3.5–5.3)
PROT ?TM UR-MCNC: 18 MG/DL — HIGH (ref 0–12)
PROT SERPL-MCNC: 7.2 G/DL — SIGNIFICANT CHANGE UP (ref 6–8.3)
PROT UR-MCNC: ABNORMAL
PROT/CREAT UR-RTO: 0.1 RATIO — SIGNIFICANT CHANGE UP (ref 0–0.2)
PROTHROM AB SERPL-ACNC: 11.2 SEC — SIGNIFICANT CHANGE UP (ref 10–12.9)
RH IG SCN BLD-IMP: POSITIVE — SIGNIFICANT CHANGE UP
SODIUM SERPL-SCNC: 137 MMOL/L — SIGNIFICANT CHANGE UP (ref 135–145)
SP GR SPEC: 1.02 — SIGNIFICANT CHANGE UP (ref 1.01–1.02)
T PALLIDUM AB TITR SER: NEGATIVE — SIGNIFICANT CHANGE UP
URATE SERPL-MCNC: 4.1 MG/DL — SIGNIFICANT CHANGE UP (ref 2.5–7)
UROBILINOGEN FLD QL: NEGATIVE — SIGNIFICANT CHANGE UP

## 2020-04-21 PROCEDURE — 88307 TISSUE EXAM BY PATHOLOGIST: CPT | Mod: 26

## 2020-04-21 RX ORDER — OXYCODONE HYDROCHLORIDE 5 MG/1
5 TABLET ORAL ONCE
Refills: 0 | Status: DISCONTINUED | OUTPATIENT
Start: 2020-04-21 | End: 2020-04-23

## 2020-04-21 RX ORDER — HYDROCORTISONE 1 %
1 OINTMENT (GRAM) TOPICAL EVERY 6 HOURS
Refills: 0 | Status: DISCONTINUED | OUTPATIENT
Start: 2020-04-21 | End: 2020-04-23

## 2020-04-21 RX ORDER — LANOLIN
1 OINTMENT (GRAM) TOPICAL EVERY 6 HOURS
Refills: 0 | Status: DISCONTINUED | OUTPATIENT
Start: 2020-04-21 | End: 2020-04-23

## 2020-04-21 RX ORDER — MAGNESIUM HYDROXIDE 400 MG/1
30 TABLET, CHEWABLE ORAL
Refills: 0 | Status: DISCONTINUED | OUTPATIENT
Start: 2020-04-21 | End: 2020-04-23

## 2020-04-21 RX ORDER — OXYTOCIN 10 UNIT/ML
333.33 VIAL (ML) INJECTION
Qty: 20 | Refills: 0 | Status: DISCONTINUED | OUTPATIENT
Start: 2020-04-21 | End: 2020-04-23

## 2020-04-21 RX ORDER — DIPHENHYDRAMINE HCL 50 MG
25 CAPSULE ORAL EVERY 6 HOURS
Refills: 0 | Status: DISCONTINUED | OUTPATIENT
Start: 2020-04-21 | End: 2020-04-23

## 2020-04-21 RX ORDER — SODIUM CHLORIDE 9 MG/ML
3 INJECTION INTRAMUSCULAR; INTRAVENOUS; SUBCUTANEOUS EVERY 8 HOURS
Refills: 0 | Status: DISCONTINUED | OUTPATIENT
Start: 2020-04-21 | End: 2020-04-23

## 2020-04-21 RX ORDER — ACETAMINOPHEN 500 MG
975 TABLET ORAL EVERY 6 HOURS
Refills: 0 | Status: DISCONTINUED | OUTPATIENT
Start: 2020-04-21 | End: 2020-04-23

## 2020-04-21 RX ORDER — SIMETHICONE 80 MG/1
80 TABLET, CHEWABLE ORAL EVERY 4 HOURS
Refills: 0 | Status: DISCONTINUED | OUTPATIENT
Start: 2020-04-21 | End: 2020-04-23

## 2020-04-21 RX ORDER — OXYCODONE HYDROCHLORIDE 5 MG/1
5 TABLET ORAL
Refills: 0 | Status: DISCONTINUED | OUTPATIENT
Start: 2020-04-21 | End: 2020-04-23

## 2020-04-21 RX ORDER — DIBUCAINE 1 %
1 OINTMENT (GRAM) RECTAL EVERY 6 HOURS
Refills: 0 | Status: DISCONTINUED | OUTPATIENT
Start: 2020-04-21 | End: 2020-04-23

## 2020-04-21 RX ORDER — OXYTOCIN 10 UNIT/ML
4 VIAL (ML) INJECTION
Qty: 30 | Refills: 0 | Status: DISCONTINUED | OUTPATIENT
Start: 2020-04-21 | End: 2020-04-23

## 2020-04-21 RX ORDER — AER TRAVELER 0.5 G/1
1 SOLUTION RECTAL; TOPICAL EVERY 4 HOURS
Refills: 0 | Status: DISCONTINUED | OUTPATIENT
Start: 2020-04-21 | End: 2020-04-23

## 2020-04-21 RX ORDER — LEVONORGESTREL 1.5 MG/1
52 TABLET ORAL ONCE
Refills: 0 | Status: COMPLETED | OUTPATIENT
Start: 2020-04-21 | End: 2020-04-21

## 2020-04-21 RX ORDER — ACETAMINOPHEN 500 MG
1000 TABLET ORAL ONCE
Refills: 0 | Status: DISCONTINUED | OUTPATIENT
Start: 2020-04-21 | End: 2020-04-23

## 2020-04-21 RX ORDER — BENZOCAINE 10 %
1 GEL (GRAM) MUCOUS MEMBRANE EVERY 6 HOURS
Refills: 0 | Status: DISCONTINUED | OUTPATIENT
Start: 2020-04-21 | End: 2020-04-23

## 2020-04-21 RX ORDER — ACETAMINOPHEN 500 MG
975 TABLET ORAL EVERY 6 HOURS
Refills: 0 | Status: COMPLETED | OUTPATIENT
Start: 2020-04-21 | End: 2021-03-20

## 2020-04-21 RX ORDER — GLYCERIN ADULT
1 SUPPOSITORY, RECTAL RECTAL AT BEDTIME
Refills: 0 | Status: DISCONTINUED | OUTPATIENT
Start: 2020-04-21 | End: 2020-04-23

## 2020-04-21 RX ORDER — PRAMOXINE HYDROCHLORIDE 150 MG/15G
1 AEROSOL, FOAM RECTAL EVERY 4 HOURS
Refills: 0 | Status: DISCONTINUED | OUTPATIENT
Start: 2020-04-21 | End: 2020-04-23

## 2020-04-21 RX ORDER — TETANUS TOXOID, REDUCED DIPHTHERIA TOXOID AND ACELLULAR PERTUSSIS VACCINE, ADSORBED 5; 2.5; 8; 8; 2.5 [IU]/.5ML; [IU]/.5ML; UG/.5ML; UG/.5ML; UG/.5ML
0.5 SUSPENSION INTRAMUSCULAR ONCE
Refills: 0 | Status: DISCONTINUED | OUTPATIENT
Start: 2020-04-21 | End: 2020-04-23

## 2020-04-21 RX ADMIN — Medication 975 MILLIGRAM(S): at 18:00

## 2020-04-21 RX ADMIN — Medication 1000 MILLIUNIT(S)/MIN: at 10:51

## 2020-04-21 RX ADMIN — SODIUM CHLORIDE 3 MILLILITER(S): 9 INJECTION INTRAMUSCULAR; INTRAVENOUS; SUBCUTANEOUS at 21:12

## 2020-04-21 RX ADMIN — Medication 4 MILLIUNIT(S)/MIN: at 07:01

## 2020-04-21 RX ADMIN — LEVONORGESTREL 52 MILLIGRAM(S): 1.5 TABLET ORAL at 09:40

## 2020-04-22 ENCOUNTER — TRANSCRIPTION ENCOUNTER (OUTPATIENT)
Age: 30
End: 2020-04-22

## 2020-04-22 LAB
ALBUMIN SERPL ELPH-MCNC: 3.3 G/DL — SIGNIFICANT CHANGE UP (ref 3.3–5)
ALP SERPL-CCNC: 144 U/L — HIGH (ref 40–120)
ALT FLD-CCNC: 32 U/L — SIGNIFICANT CHANGE UP (ref 10–45)
ANION GAP SERPL CALC-SCNC: 13 MMOL/L — SIGNIFICANT CHANGE UP (ref 5–17)
APTT BLD: 26.6 SEC — LOW (ref 27.5–36.3)
AST SERPL-CCNC: 38 U/L — SIGNIFICANT CHANGE UP (ref 10–40)
BASOPHILS # BLD AUTO: 0.03 K/UL — SIGNIFICANT CHANGE UP (ref 0–0.2)
BASOPHILS NFR BLD AUTO: 0.4 % — SIGNIFICANT CHANGE UP (ref 0–2)
BILIRUB SERPL-MCNC: 0.4 MG/DL — SIGNIFICANT CHANGE UP (ref 0.2–1.2)
BUN SERPL-MCNC: 6 MG/DL — LOW (ref 7–23)
CALCIUM SERPL-MCNC: 9.1 MG/DL — SIGNIFICANT CHANGE UP (ref 8.4–10.5)
CHLORIDE SERPL-SCNC: 102 MMOL/L — SIGNIFICANT CHANGE UP (ref 96–108)
CO2 SERPL-SCNC: 22 MMOL/L — SIGNIFICANT CHANGE UP (ref 22–31)
CREAT SERPL-MCNC: 0.41 MG/DL — LOW (ref 0.5–1.3)
EOSINOPHIL # BLD AUTO: 0.08 K/UL — SIGNIFICANT CHANGE UP (ref 0–0.5)
EOSINOPHIL NFR BLD AUTO: 1 % — SIGNIFICANT CHANGE UP (ref 0–6)
FIBRINOGEN PPP-MCNC: 656 MG/DL — HIGH (ref 320–500)
GLUCOSE SERPL-MCNC: 73 MG/DL — SIGNIFICANT CHANGE UP (ref 70–99)
HCT VFR BLD CALC: 34.6 % — SIGNIFICANT CHANGE UP (ref 34.5–45)
HGB BLD-MCNC: 11.4 G/DL — LOW (ref 11.5–15.5)
IMM GRANULOCYTES NFR BLD AUTO: 1 % — SIGNIFICANT CHANGE UP (ref 0–1.5)
INR BLD: 0.92 RATIO — SIGNIFICANT CHANGE UP (ref 0.88–1.16)
LDH SERPL L TO P-CCNC: 185 U/L — SIGNIFICANT CHANGE UP (ref 50–242)
LYMPHOCYTES # BLD AUTO: 1.64 K/UL — SIGNIFICANT CHANGE UP (ref 1–3.3)
LYMPHOCYTES # BLD AUTO: 21 % — SIGNIFICANT CHANGE UP (ref 13–44)
MCHC RBC-ENTMCNC: 28.1 PG — SIGNIFICANT CHANGE UP (ref 27–34)
MCHC RBC-ENTMCNC: 32.9 GM/DL — SIGNIFICANT CHANGE UP (ref 32–36)
MCV RBC AUTO: 85.2 FL — SIGNIFICANT CHANGE UP (ref 80–100)
MONOCYTES # BLD AUTO: 0.44 K/UL — SIGNIFICANT CHANGE UP (ref 0–0.9)
MONOCYTES NFR BLD AUTO: 5.6 % — SIGNIFICANT CHANGE UP (ref 2–14)
NEUTROPHILS # BLD AUTO: 5.54 K/UL — SIGNIFICANT CHANGE UP (ref 1.8–7.4)
NEUTROPHILS NFR BLD AUTO: 71 % — SIGNIFICANT CHANGE UP (ref 43–77)
NRBC # BLD: 0 /100 WBCS — SIGNIFICANT CHANGE UP (ref 0–0)
PLATELET # BLD AUTO: 168 K/UL — SIGNIFICANT CHANGE UP (ref 150–400)
POTASSIUM SERPL-MCNC: 3.8 MMOL/L — SIGNIFICANT CHANGE UP (ref 3.5–5.3)
POTASSIUM SERPL-SCNC: 3.8 MMOL/L — SIGNIFICANT CHANGE UP (ref 3.5–5.3)
PROT SERPL-MCNC: 6.5 G/DL — SIGNIFICANT CHANGE UP (ref 6–8.3)
PROTHROM AB SERPL-ACNC: 10.5 SEC — SIGNIFICANT CHANGE UP (ref 10–13.1)
RBC # BLD: 4.06 M/UL — SIGNIFICANT CHANGE UP (ref 3.8–5.2)
RBC # FLD: 14.6 % — HIGH (ref 10.3–14.5)
SODIUM SERPL-SCNC: 137 MMOL/L — SIGNIFICANT CHANGE UP (ref 135–145)
URATE SERPL-MCNC: 4 MG/DL — SIGNIFICANT CHANGE UP (ref 2.5–7)
WBC # BLD: 7.81 K/UL — SIGNIFICANT CHANGE UP (ref 3.8–10.5)
WBC # FLD AUTO: 7.81 K/UL — SIGNIFICANT CHANGE UP (ref 3.8–10.5)

## 2020-04-22 RX ORDER — ACETAMINOPHEN 500 MG
100 TABLET ORAL
Qty: 0 | Refills: 0 | DISCHARGE
Start: 2020-04-22

## 2020-04-22 RX ADMIN — Medication 975 MILLIGRAM(S): at 17:08

## 2020-04-22 RX ADMIN — Medication 975 MILLIGRAM(S): at 10:41

## 2020-04-22 RX ADMIN — SODIUM CHLORIDE 3 MILLILITER(S): 9 INJECTION INTRAMUSCULAR; INTRAVENOUS; SUBCUTANEOUS at 22:00

## 2020-04-22 RX ADMIN — Medication 975 MILLIGRAM(S): at 01:28

## 2020-04-22 RX ADMIN — SODIUM CHLORIDE 3 MILLILITER(S): 9 INJECTION INTRAMUSCULAR; INTRAVENOUS; SUBCUTANEOUS at 06:05

## 2020-04-22 RX ADMIN — SODIUM CHLORIDE 3 MILLILITER(S): 9 INJECTION INTRAMUSCULAR; INTRAVENOUS; SUBCUTANEOUS at 15:16

## 2020-04-22 RX ADMIN — Medication 1 TABLET(S): at 14:10

## 2020-04-22 NOTE — PROGRESS NOTE ADULT - ASSESSMENT
A/P: 30yo 3114 PPD#1 s/p  w/ asymptomatic COVID, CHTN (no meds); Hx of GDM-A2.  Patient is stable and doing well post-partum and meeting all milestones.  - CHTN, not on any meds and BP's in nl range; Pt to continue to monitor BP's at home and have checked in 1wk  - Pt is stable for D/C home today  - F/U for routine PP visit in 6wks and Mirena check  - Hx of GDM-a2 and should have PP GCT after 6wks  - Fever/ pain/ bleeding/ Pre-eclampsia precautions given    Dr. Taylor

## 2020-04-22 NOTE — DISCHARGE NOTE OB - MEDICATION SUMMARY - MEDICATIONS TO STOP TAKING
I will STOP taking the medications listed below when I get home from the hospital:    oxycodone-acetaminophen 5mg-325mg oral tablet  -- 1 tab(s) by mouth every 6 hours, As Needed -for moderate pain MDD:8  -- Caution federal law prohibits the transfer of this drug to any person other  than the person for whom it was prescribed.  May cause drowsiness.  Alcohol may intensify this effect.  Use care when operating dangerous machinery.  This prescription cannot be refilled.  This product contains acetaminophen.  Do not use  with any other product containing acetaminophen to prevent possible liver damage.  Using more of this medication than prescribed may cause serious breathing problems.    cephalexin 500 mg oral tablet  -- 1 tab(s) by mouth 2 times a day   -- Finish all this medication unless otherwise directed by prescriber.

## 2020-04-22 NOTE — DISCHARGE NOTE OB - CARE PROVIDER_API CALL
[facility],   [address] [place] [address]  OBGYN [facility] [place] [address]  Phone: [phone]  Fax: (   )    -  Follow Up Time:

## 2020-04-22 NOTE — DISCHARGE NOTE OB - PATIENT PORTAL LINK FT
You can access the FollowMyHealth Patient Portal offered by [facility] by registering at the following website: [email or web address]. By joining [email or web address] FollowMyHealth portal, you will also be able to view your health information using other applications (apps) compatible with our system.

## 2020-04-22 NOTE — DISCHARGE NOTE OB - PROVIDER TOKENS
FREE:[LAST:[[facility]],PHONE:[phone]],FAX:[(   )    -],ADDRESS:[[address] [place] [id number]]]

## 2020-04-22 NOTE — PROGRESS NOTE ADULT - SUBJECTIVE AND OBJECTIVE BOX
OB Progress Note:  PPD#1 w/ PP Mirena in place, COVID +, CHTN, GDM-A2    S: 28yo 3114 PPD#1 s/p  w/ asymptomatic COVID, CHTN (no meds); Hx of GDM-A2.  Patient feels well. Pain is well controlled. She is tolerating a regular diet and passing flatus. She is voiding spontaneously, and ambulating without difficulty. Denies CP/SOB. Denies lightheadedness/dizziness. Denies N/V.    O:  Vitals:  Vital Signs Last 24 Hrs  T(C): 36.3 (2020 04:59), Max: 37 (2020 00:10)  T(F): 97.3 (2020 04:59), Max: 98.6 (2020 00:10)  HR: 66 (2020 04:59) (66 - 88)  BP: 102/60 (2020 04:59) (102/60 - 130/59)  BP(mean): 83 (2020 13:00) (81 - 89)  RR: 16 (2020 04:59) (14 - 20)  SpO2: 99% (2020 04:59) (96% - 100%)    MEDICATIONS  (STANDING):  acetaminophen   Tablet .. 975 milliGRAM(s) Oral every 6 hours  acetaminophen  IVPB .. 1000 milliGRAM(s) IV Intermittent once  diphtheria/tetanus/pertussis (acellular) Vaccine (ADAcel) 0.5 milliLiter(s) IntraMuscular once  oxytocin Infusion 333.333 milliUNIT(s)/Min (1000 mL/Hr) IV Continuous <Continuous>  oxytocin Infusion 4 milliUNIT(s)/Min (4 mL/Hr) IV Continuous <Continuous>  prenatal multivitamin 1 Tablet(s) Oral daily  sodium chloride 0.9% lock flush 3 milliLiter(s) IV Push every 8 hours      Labs:  Blood type: B Positive  Rubella IgG: Positive ( @ 22:22)  RPR: Negative                          11.3<L>   7.49 >-----------< 181    (  @ 23:41 )             36.0    - @ 23:41      137  |  102  |  6<L>  ----------------------------<  86  3.9   |  19<L>  |  0.41<L>        Ca    9.0      2020 23:41    TPro  7.2  /  Alb  3.8  /  TBili  0.3  /  DBili  x   /  AST  31  /  ALT  34  /  AlkPhos  163<H>  20 @ 23:41    Physical Exam:  General: NAD  Abdomen: soft, non-tender, non-distended, fundus firm  Vaginal: Lochia wnl  Extremities: No erythema/edema

## 2020-04-22 NOTE — DISCHARGE NOTE OB - HOSPITAL COURSE
Pt had an uncomplicated  followed by an uncomplicated postpartum course. Patient was discharged home in stable condition, voiding spontaneously, pain well controlled, ambulating, tolerating PO and with normal vital signs. Pt had pp mirena successfully inserted. Pt plans to follow up in the [facility] in 1 week for a BP check and 6 weeks for postpartum check. Telephone number and clinic information provided prior to discharge. Pt had an uncomplicated  followed by an uncomplicated postpartum course. Patient was discharged home in stable condition, voiding spontaneously, pain well controlled, ambulating, tolerating PO and with normal vital signs. Pt had pp mirena successfully inserted. Pt plans to follow up in the [facility] in 1 week for a BP check and 6 weeks for postpartum check. Telephone number and clinic information provided prior to discharge. Follow up appointment made for Friday @1130am and patient told before discharge.

## 2020-04-22 NOTE — DISCHARGE NOTE OB - PLAN OF CARE
wellness After discharge, please stay on pelvic rest for 6 weeks, meaning no sexual intercourse, no tampons and no douching.  No driving for 2 weeks as women can loose a lot of blood during delivery and there is a possibility of being lightheaded/fainting.  No lifting objects heavier than baby for two weeks.  Expect to have vaginal bleeding/spotting for up to six weeks.  The bleeding should get lighter and more white/light brown with time.  For bleeding soaking more than a pad an hour or passing clots greater than the size of your fist, come in to the emergency department.    Follow up in clinic in 1 week for BP check and 6 weeks for postpartum check.

## 2020-04-22 NOTE — DISCHARGE NOTE OB - CARE PLAN
Principal Discharge DX:	 (normal spontaneous vaginal delivery)  Goal:	wellness  Assessment and plan of treatment:	After discharge, please stay on pelvic rest for 6 weeks, meaning no sexual intercourse, no tampons and no douching.  No driving for 2 weeks as women can loose a lot of blood during delivery and there is a possibility of being lightheaded/fainting.  No lifting objects heavier than baby for two weeks.  Expect to have vaginal bleeding/spotting for up to six weeks.  The bleeding should get lighter and more white/light brown with time.  For bleeding soaking more than a pad an hour or passing clots greater than the size of your fist, come in to the emergency department.    Follow up in clinic in 1 week for BP check and 6 weeks for postpartum check.

## 2020-04-22 NOTE — DISCHARGE NOTE OB - MATERIALS PROVIDED
Back To Sleep Handout/Shaken Baby Prevention Handout/Breastfeeding Guide and Packet/[facility]  Screening Program/Bottle Feeding Log/Vaccinations/Breastfeeding Mother’s Support Group Information/Guide to Postpartum Care/Birth Certificate Instructions/Discharge Medication Information for Patients and Families Pocket Guide/[facility] Hearing Screen Program/  Immunization Record/Breastfeeding Log

## 2020-04-23 VITALS — WEIGHT: 162.92 LBS

## 2020-04-23 PROCEDURE — 86900 BLOOD TYPING SEROLOGIC ABO: CPT

## 2020-04-23 PROCEDURE — 59050 FETAL MONITOR W/REPORT: CPT

## 2020-04-23 PROCEDURE — 86780 TREPONEMA PALLIDUM: CPT

## 2020-04-23 PROCEDURE — 86850 RBC ANTIBODY SCREEN: CPT

## 2020-04-23 PROCEDURE — 87340 HEPATITIS B SURFACE AG IA: CPT

## 2020-04-23 PROCEDURE — 82570 ASSAY OF URINE CREATININE: CPT

## 2020-04-23 PROCEDURE — 88307 TISSUE EXAM BY PATHOLOGIST: CPT

## 2020-04-23 PROCEDURE — 85610 PROTHROMBIN TIME: CPT

## 2020-04-23 PROCEDURE — 84156 ASSAY OF PROTEIN URINE: CPT

## 2020-04-23 PROCEDURE — 85384 FIBRINOGEN ACTIVITY: CPT

## 2020-04-23 PROCEDURE — 80053 COMPREHEN METABOLIC PANEL: CPT

## 2020-04-23 PROCEDURE — 85730 THROMBOPLASTIN TIME PARTIAL: CPT

## 2020-04-23 PROCEDURE — 86901 BLOOD TYPING SEROLOGIC RH(D): CPT

## 2020-04-23 PROCEDURE — 85027 COMPLETE CBC AUTOMATED: CPT

## 2020-04-23 PROCEDURE — 82962 GLUCOSE BLOOD TEST: CPT

## 2020-04-23 PROCEDURE — 83615 LACTATE (LD) (LDH) ENZYME: CPT

## 2020-04-23 PROCEDURE — 59025 FETAL NON-STRESS TEST: CPT

## 2020-04-23 PROCEDURE — 81001 URINALYSIS AUTO W/SCOPE: CPT

## 2020-04-23 PROCEDURE — 84550 ASSAY OF BLOOD/URIC ACID: CPT

## 2020-04-23 RX ADMIN — Medication 975 MILLIGRAM(S): at 06:00

## 2020-04-23 RX ADMIN — SODIUM CHLORIDE 3 MILLILITER(S): 9 INJECTION INTRAMUSCULAR; INTRAVENOUS; SUBCUTANEOUS at 06:55

## 2020-04-23 RX ADMIN — Medication 975 MILLIGRAM(S): at 01:00

## 2020-04-23 NOTE — PROGRESS NOTE ADULT - ASSESSMENT
A/P: 30yo PPD#2 s/p . CHTN on no meds - normal BPs.   Patient is stable and doing well post-partum.  COVID++++  - Cont routine pp care  - Discharge planning today    JYOTI Blandon

## 2020-04-23 NOTE — PROGRESS NOTE ADULT - SUBJECTIVE AND OBJECTIVE BOX
OB Progress Note:  PPD#2, after IOL for CHTN/GDMA2, Mirena placed - COVID++++    S:  Patient feels well. Pain is well controlled. She is tolerating a regular diet. She is voiding spontaneously, and ambulating without difficulty. LOchia normal.  Desires to go home.    O:  Vitals:   Vital Signs Last 24 Hrs  T(C): 36.8 (2020 06:00), Max: 36.8 (2020 09:00)  T(F): 98.2 (2020 06:00), Max: 98.2 (2020 09:00)  HR: 70 (2020 06:00) (69 - 83)  BP: 129/86 (2020 06:00) (116/73 - 129/86)  BP(mean): --  RR: 18 (2020 06:00) (18 - 18)  SpO2: 98% (2020 06:00) (97% - 99%)    MEDICATIONS  (STANDING):  acetaminophen   Tablet .. 975 milliGRAM(s) Oral every 6 hours  acetaminophen  IVPB .. 1000 milliGRAM(s) IV Intermittent once  diphtheria/tetanus/pertussis (acellular) Vaccine (ADAcel) 0.5 milliLiter(s) IntraMuscular once  oxytocin Infusion 333.333 milliUNIT(s)/Min (1000 mL/Hr) IV Continuous <Continuous>  oxytocin Infusion 4 milliUNIT(s)/Min (4 mL/Hr) IV Continuous <Continuous>  prenatal multivitamin 1 Tablet(s) Oral daily  sodium chloride 0.9% lock flush 3 milliLiter(s) IV Push every 8 hours    MEDICATIONS  (PRN):  benzocaine 20%/menthol 0.5% Spray 1 Spray(s) Topical every 6 hours PRN for Perineal discomfort  dibucaine 1% Ointment 1 Application(s) Topical every 6 hours PRN Perineal discomfort  diphenhydrAMINE 25 milliGRAM(s) Oral every 6 hours PRN Pruritus  glycerin Suppository - Adult 1 Suppository(s) Rectal at bedtime PRN Constipation  hydrocortisone 1% Cream 1 Application(s) Topical every 6 hours PRN Moderate Pain (4-6)  lanolin Ointment 1 Application(s) Topical every 6 hours PRN nipple soreness  magnesium hydroxide Suspension 30 milliLiter(s) Oral two times a day PRN Constipation  oxyCODONE    IR 5 milliGRAM(s) Oral every 3 hours PRN Moderate to Severe Pain (4-10)  oxyCODONE    IR 5 milliGRAM(s) Oral once PRN Moderate to Severe Pain (4-10)  pramoxine 1%/zinc 5% Cream 1 Application(s) Topical every 4 hours PRN Moderate Pain (4-6)  simethicone 80 milliGRAM(s) Chew every 4 hours PRN Gas  witch hazel Pads 1 Application(s) Topical every 4 hours PRN Perineal discomfort      Labs:  Blood type: B Positive  Rubella IgG: Positive ( 22:22)  RPR: Negative                          11.4<L>   7.81 >-----------< 168    (  @ 07:16 )             34.6                        11.3<L>   7.49 >-----------< 181    (  @ 23:41 )             36.0    20 @ 07:13      137  |  102  |  6<L>  ----------------------------<  73  3.8   |  22  |  0.41<L>    20 @ 23:41      137  |  102  |  6<L>  ----------------------------<  86  3.9   |  19<L>  |  0.41<L>        Ca    9.1      2020 07:13  Ca    9.0      2020 23:41    TPro  6.5  /  Alb  3.3  /  TBili  0.4  /  DBili  x   /  AST  38  /  ALT  32  /  AlkPhos  144<H>  20 @ 07:13  TPro  7.2  /  Alb  3.8  /  TBili  0.3  /  DBili  x   /  AST  31  /  ALT  34  /  AlkPhos  163<H>  20 @ 23:41          Physical Exam:  General: NAD  Abdomen: soft, non-tender, non-distended, fundus firm  Vaginal: Lochia wnl  Extremities: No erythema/edema

## 2020-04-23 NOTE — DIETITIAN INITIAL EVALUATION ADULT. - OTHER INFO
Pt is 30yo  s/p  at 36.4 weeks. Plans on breast and bottle feeding  female.  Good appetite reported. No c/o nausea, vomiting, diarrhea, or constipation. No BM since delivery.  Pt endorses following a CSTCHOGDM therapeutic diet while pregnant. Endorses taking insulin and metformin for BG control during pregnancy. States she had GDM during her last pregnancy as well.  Pt endorses taking a prenatal multivitamin while pregnant. NFKA reported.    Nutrition education provided: Pt educated on postpartum dietary recommendations including: risk of development of T2DM, ways to reduce risk of developing T2DM and reinforced importance of DM screening 4-12 weeks postpartum. Pt verbalized good understanding. Written materials left with RN to provide to pt.

## 2020-04-23 NOTE — DIETITIAN INITIAL EVALUATION ADULT. - PHYSICAL APPEARANCE
other (specify) Height: 65 inches, Weight: 163 pounds (pre-pregnancy)  BMI: 27.1 kg/m2 IBW: 125 pounds (+/-10%), %IBW: 130%  Pertinent Info: No edema noted, no pressure injuries noted at this time in nursing flow sheet.

## 2020-04-23 NOTE — DIETITIAN INITIAL EVALUATION ADULT. - ADD RECOMMEND
1. Recommend continue current regular diet, no therapeutic restrictions indicated 2. Provide/review nutrition education as indicated 3. Will continue to monitor nutrient intake, wt, labs, f/u per protocol

## 2020-04-23 NOTE — DIETITIAN INITIAL EVALUATION ADULT. - REASON INDICATOR FOR ASSESSMENT
Nutrition consult received for postpartum GDM.  RD interview unable to be conducted face-to-face at this time secondary to restricted isolation precautions with pt current medical condition. RD interview conducted via phone, utilizing [employer]  services ([place] ID #[id number]) utilized as pt is Prydein[place] speaking. Information obtained from: pt, electronic medical record. Nutrition consult received for postpartum GDM.  RD interview unable to be conducted face-to-face at this time secondary to restricted isolation precautions with pt current medical condition- pt COVID-19 + per chart. RD interview conducted via phone, utilizing [employer]  services ([place] ID #[id number]) utilized as pt is Qatari speaking. Information obtained from: pt, electronic medical record.

## 2020-04-23 NOTE — DIETITIAN INITIAL EVALUATION ADULT. - PERTINENT MEDS FT
MEDICATIONS  (STANDING):  acetaminophen   Tablet .. 975 milliGRAM(s) Oral every 6 hours  acetaminophen  IVPB .. 1000 milliGRAM(s) IV Intermittent once  diphtheria/tetanus/pertussis (acellular) Vaccine (ADAcel) 0.5 milliLiter(s) IntraMuscular once  oxytocin Infusion 333.333 milliUNIT(s)/Min (1000 mL/Hr) IV Continuous <Continuous>  oxytocin Infusion 4 milliUNIT(s)/Min (4 mL/Hr) IV Continuous <Continuous>  prenatal multivitamin 1 Tablet(s) Oral daily  sodium chloride 0.9% lock flush 3 milliLiter(s) IV Push every 8 hours    MEDICATIONS  (PRN):  benzocaine 20%/menthol 0.5% Spray 1 Spray(s) Topical every 6 hours PRN for Perineal discomfort  dibucaine 1% Ointment 1 Application(s) Topical every 6 hours PRN Perineal discomfort  diphenhydrAMINE 25 milliGRAM(s) Oral every 6 hours PRN Pruritus  glycerin Suppository - Adult 1 Suppository(s) Rectal at bedtime PRN Constipation  hydrocortisone 1% Cream 1 Application(s) Topical every 6 hours PRN Moderate Pain (4-6)  lanolin Ointment 1 Application(s) Topical every 6 hours PRN nipple soreness  magnesium hydroxide Suspension 30 milliLiter(s) Oral two times a day PRN Constipation  oxyCODONE    IR 5 milliGRAM(s) Oral every 3 hours PRN Moderate to Severe Pain (4-10)  oxyCODONE    IR 5 milliGRAM(s) Oral once PRN Moderate to Severe Pain (4-10)  pramoxine 1%/zinc 5% Cream 1 Application(s) Topical every 4 hours PRN Moderate Pain (4-6)  simethicone 80 milliGRAM(s) Chew every 4 hours PRN Gas  witch hazel Pads 1 Application(s) Topical every 4 hours PRN Perineal discomfort

## 2020-04-24 ENCOUNTER — OUTPATIENT (OUTPATIENT)
Dept: OUTPATIENT SERVICES | Facility: HOSPITAL | Age: 30
LOS: 1 days | End: 2020-04-24
Payer: MEDICAID

## 2020-04-24 ENCOUNTER — APPOINTMENT (OUTPATIENT)
Dept: MATERNAL FETAL MEDICINE | Facility: CLINIC | Age: 30
End: 2020-04-24
Payer: MEDICAID

## 2020-04-24 ENCOUNTER — APPOINTMENT (OUTPATIENT)
Dept: MATERNAL FETAL MEDICINE | Facility: CLINIC | Age: 30
End: 2020-04-24

## 2020-04-24 DIAGNOSIS — I10 ESSENTIAL (PRIMARY) HYPERTENSION: ICD-10-CM

## 2020-04-24 DIAGNOSIS — Z71.89 OTHER SPECIFIED COUNSELING: ICD-10-CM

## 2020-04-24 DIAGNOSIS — Z98.51 TUBAL LIGATION STATUS: Chronic | ICD-10-CM

## 2020-04-24 PROCEDURE — 99213 OFFICE O/P EST LOW 20 MIN: CPT | Mod: TH,GC,95

## 2020-04-24 PROCEDURE — G0463: CPT

## 2020-04-25 NOTE — HISTORY OF PRESENT ILLNESS
[Last Pap Date: ___] : Last Pap Date: [unfilled] [Postpartum Follow Up] : postpartum follow up [Delivery Date: ___] : on [unfilled] [] : delivered by vaginal delivery [Breastfeeding] : currently nursing [BreastFeeding Problems] : breastfeeding problems [Intended Contraception] : Intended Contraception: [Abdominal Pain] : abdominal pain [No Sign of Infection] : is showing no signs of infection [None] : No associated symptoms are reported [Doing Well] : is doing well [Resumed Menses] : has not resumed her menses [Resumed [place]] : has not resumed intercourse [Chest Pain] : no chest pain [S/Sx PP Depression] : no signs/symptoms of postpartum depression [Heavy Bleeding] : no heavy bleeding [FreeTextEntry8] : This was a telehealth visit, conducted via doxy.me audio/video. Patient provided verbal consent [FreeTextEntry9] : GDMA2, chronic HTN [de-identified] : No severe headaches, blurry vision, or epigastric pain. [de-identified] : n/a [de-identified] : 31yo P3 s/p  + Mirena IUD insertion. Prenatal course c/b GDMA2 and chronic HTN (not on meds). BP normal today 116/67, HR 94bpm. Patient without sx of preeclampsia. Denies depression, feels well. [de-identified] : - 2 wk telehealth visit, and then 6 wk PP visit for string check and 2hr GTT

## 2020-04-27 ENCOUNTER — APPOINTMENT (OUTPATIENT)
Dept: MATERNAL FETAL MEDICINE | Facility: CLINIC | Age: 30
End: 2020-04-27

## 2020-04-27 LAB — SURGICAL PATHOLOGY STUDY: SIGNIFICANT CHANGE UP

## 2020-04-28 ENCOUNTER — APPOINTMENT (OUTPATIENT)
Dept: MATERNAL FETAL MEDICINE | Facility: CLINIC | Age: 30
End: 2020-04-28

## 2020-05-08 ENCOUNTER — APPOINTMENT (OUTPATIENT)
Dept: MATERNAL FETAL MEDICINE | Facility: CLINIC | Age: 30
End: 2020-05-08
Payer: MEDICAID

## 2020-05-08 ENCOUNTER — EMERGENCY (EMERGENCY)
Facility: HOSPITAL | Age: 30
LOS: 1 days | Discharge: HOME | End: 2020-05-08
Attending: EMERGENCY MEDICINE
Payer: MEDICAID

## 2020-05-08 ENCOUNTER — OUTPATIENT (OUTPATIENT)
Dept: OUTPATIENT SERVICES | Facility: HOSPITAL | Age: 30
LOS: 1 days | End: 2020-05-08
Payer: SELF-PAY

## 2020-05-08 VITALS
SYSTOLIC BLOOD PRESSURE: 122 MMHG | OXYGEN SATURATION: 98 % | TEMPERATURE: 98 F | DIASTOLIC BLOOD PRESSURE: 82 MMHG | RESPIRATION RATE: 16 BRPM | HEART RATE: 92 BPM | WEIGHT: 160.06 LBS | HEIGHT: 63 IN

## 2020-05-08 VITALS
HEART RATE: 62 BPM | DIASTOLIC BLOOD PRESSURE: 71 MMHG | SYSTOLIC BLOOD PRESSURE: 106 MMHG | OXYGEN SATURATION: 97 % | RESPIRATION RATE: 18 BRPM

## 2020-05-08 DIAGNOSIS — O26.613 LIVER AND BILIARY TRACT DISORDERS IN PREGNANCY, THIRD TRIMESTER: ICD-10-CM

## 2020-05-08 DIAGNOSIS — O24.419 GESTATIONAL DIABETES MELLITUS IN PREGNANCY, UNSPECIFIED CONTROL: ICD-10-CM

## 2020-05-08 DIAGNOSIS — R51 HEADACHE: ICD-10-CM

## 2020-05-08 DIAGNOSIS — O10.919 UNSPECIFIED PRE-EXISTING HYPERTENSION COMPLICATING PREGNANCY, UNSPECIFIED TRIMESTER: ICD-10-CM

## 2020-05-08 DIAGNOSIS — Z86.32 PERSONAL HISTORY OF GESTATIONAL DIABETES: ICD-10-CM

## 2020-05-08 DIAGNOSIS — K83.1 LIVER AND BILIARY TRACT DISORDERS IN PREGNANCY, THIRD TRIMESTER: ICD-10-CM

## 2020-05-08 DIAGNOSIS — O09.899 SUPERVISION OF OTHER HIGH RISK PREGNANCIES, UNSPECIFIED TRIMESTER: ICD-10-CM

## 2020-05-08 DIAGNOSIS — Z98.51 TUBAL LIGATION STATUS: Chronic | ICD-10-CM

## 2020-05-08 DIAGNOSIS — O09.90 SUPERVISION OF HIGH RISK PREGNANCY, UNSPECIFIED, UNSPECIFIED TRIMESTER: ICD-10-CM

## 2020-05-08 DIAGNOSIS — Z34.90 ENCOUNTER FOR SUPERVISION OF NORMAL PREGNANCY, UNSPECIFIED, UNSPECIFIED TRIMESTER: ICD-10-CM

## 2020-05-08 LAB
ALBUMIN SERPL ELPH-MCNC: 4.4 G/DL — SIGNIFICANT CHANGE UP (ref 3.3–5)
ALP SERPL-CCNC: 102 U/L — SIGNIFICANT CHANGE UP (ref 40–120)
ALT FLD-CCNC: 27 U/L — SIGNIFICANT CHANGE UP (ref 10–45)
ANION GAP SERPL CALC-SCNC: 14 MMOL/L — SIGNIFICANT CHANGE UP (ref 5–17)
APPEARANCE UR: CLEAR — SIGNIFICANT CHANGE UP
APTT BLD: 33.5 SEC — SIGNIFICANT CHANGE UP (ref 27.5–36.3)
AST SERPL-CCNC: 28 U/L — SIGNIFICANT CHANGE UP (ref 10–40)
BASOPHILS # BLD AUTO: 0.04 K/UL — SIGNIFICANT CHANGE UP (ref 0–0.2)
BASOPHILS NFR BLD AUTO: 0.8 % — SIGNIFICANT CHANGE UP (ref 0–2)
BILIRUB SERPL-MCNC: 0.4 MG/DL — SIGNIFICANT CHANGE UP (ref 0.2–1.2)
BILIRUB UR-MCNC: NEGATIVE — SIGNIFICANT CHANGE UP
BUN SERPL-MCNC: 7 MG/DL — SIGNIFICANT CHANGE UP (ref 7–23)
CALCIUM SERPL-MCNC: 9.7 MG/DL — SIGNIFICANT CHANGE UP (ref 8.4–10.5)
CHLORIDE SERPL-SCNC: 103 MMOL/L — SIGNIFICANT CHANGE UP (ref 96–108)
CO2 SERPL-SCNC: 24 MMOL/L — SIGNIFICANT CHANGE UP (ref 22–31)
COLOR SPEC: YELLOW — SIGNIFICANT CHANGE UP
CREAT SERPL-MCNC: 0.62 MG/DL — SIGNIFICANT CHANGE UP (ref 0.5–1.3)
DIFF PNL FLD: ABNORMAL
EOSINOPHIL # BLD AUTO: 0.09 K/UL — SIGNIFICANT CHANGE UP (ref 0–0.5)
EOSINOPHIL NFR BLD AUTO: 1.8 % — SIGNIFICANT CHANGE UP (ref 0–6)
GLUCOSE SERPL-MCNC: 89 MG/DL — SIGNIFICANT CHANGE UP (ref 70–99)
GLUCOSE UR QL: NEGATIVE — SIGNIFICANT CHANGE UP
HCT VFR BLD CALC: 42 % — SIGNIFICANT CHANGE UP (ref 34.5–45)
HGB BLD-MCNC: 13.6 G/DL — SIGNIFICANT CHANGE UP (ref 11.5–15.5)
IMM GRANULOCYTES NFR BLD AUTO: 0.4 % — SIGNIFICANT CHANGE UP (ref 0–1.5)
INR BLD: 1.12 RATIO — SIGNIFICANT CHANGE UP (ref 0.88–1.16)
KETONES UR-MCNC: NEGATIVE — SIGNIFICANT CHANGE UP
LEUKOCYTE ESTERASE UR-ACNC: ABNORMAL
LYMPHOCYTES # BLD AUTO: 1.62 K/UL — SIGNIFICANT CHANGE UP (ref 1–3.3)
LYMPHOCYTES # BLD AUTO: 33.2 % — SIGNIFICANT CHANGE UP (ref 13–44)
MCHC RBC-ENTMCNC: 27.5 PG — SIGNIFICANT CHANGE UP (ref 27–34)
MCHC RBC-ENTMCNC: 32.4 GM/DL — SIGNIFICANT CHANGE UP (ref 32–36)
MCV RBC AUTO: 85 FL — SIGNIFICANT CHANGE UP (ref 80–100)
MONOCYTES # BLD AUTO: 0.27 K/UL — SIGNIFICANT CHANGE UP (ref 0–0.9)
MONOCYTES NFR BLD AUTO: 5.5 % — SIGNIFICANT CHANGE UP (ref 2–14)
NEUTROPHILS # BLD AUTO: 2.84 K/UL — SIGNIFICANT CHANGE UP (ref 1.8–7.4)
NEUTROPHILS NFR BLD AUTO: 58.3 % — SIGNIFICANT CHANGE UP (ref 43–77)
NITRITE UR-MCNC: NEGATIVE — SIGNIFICANT CHANGE UP
NRBC # BLD: 0 /100 WBCS — SIGNIFICANT CHANGE UP (ref 0–0)
PH UR: 6.5 — SIGNIFICANT CHANGE UP (ref 5–8)
PLATELET # BLD AUTO: 275 K/UL — SIGNIFICANT CHANGE UP (ref 150–400)
POTASSIUM SERPL-MCNC: 4.1 MMOL/L — SIGNIFICANT CHANGE UP (ref 3.5–5.3)
POTASSIUM SERPL-SCNC: 4.1 MMOL/L — SIGNIFICANT CHANGE UP (ref 3.5–5.3)
PROT SERPL-MCNC: 7.3 G/DL — SIGNIFICANT CHANGE UP (ref 6–8.3)
PROT UR-MCNC: NEGATIVE — SIGNIFICANT CHANGE UP
PROTHROM AB SERPL-ACNC: 12.9 SEC — SIGNIFICANT CHANGE UP (ref 10–12.9)
RBC # BLD: 4.94 M/UL — SIGNIFICANT CHANGE UP (ref 3.8–5.2)
RBC # FLD: 13.6 % — SIGNIFICANT CHANGE UP (ref 10.3–14.5)
SODIUM SERPL-SCNC: 141 MMOL/L — SIGNIFICANT CHANGE UP (ref 135–145)
SP GR SPEC: 1.02 — SIGNIFICANT CHANGE UP (ref 1.01–1.02)
URATE SERPL-MCNC: 5.9 MG/DL — SIGNIFICANT CHANGE UP (ref 2.5–7)
UROBILINOGEN FLD QL: NEGATIVE — SIGNIFICANT CHANGE UP
WBC # BLD: 4.88 K/UL — SIGNIFICANT CHANGE UP (ref 3.8–10.5)
WBC # FLD AUTO: 4.88 K/UL — SIGNIFICANT CHANGE UP (ref 3.8–10.5)

## 2020-05-08 PROCEDURE — 85610 PROTHROMBIN TIME: CPT

## 2020-05-08 PROCEDURE — 84550 ASSAY OF BLOOD/URIC ACID: CPT

## 2020-05-08 PROCEDURE — 99284 EMERGENCY DEPT VISIT MOD MDM: CPT | Mod: 25

## 2020-05-08 PROCEDURE — 80053 COMPREHEN METABOLIC PANEL: CPT

## 2020-05-08 PROCEDURE — 99213 OFFICE O/P EST LOW 20 MIN: CPT | Mod: TH,GC,95

## 2020-05-08 PROCEDURE — 85027 COMPLETE CBC AUTOMATED: CPT

## 2020-05-08 PROCEDURE — G0463: CPT

## 2020-05-08 PROCEDURE — 99284 EMERGENCY DEPT VISIT MOD MDM: CPT

## 2020-05-08 PROCEDURE — 83615 LACTATE (LD) (LDH) ENZYME: CPT

## 2020-05-08 PROCEDURE — 99282 EMERGENCY DEPT VISIT SF MDM: CPT

## 2020-05-08 PROCEDURE — 96374 THER/PROPH/DIAG INJ IV PUSH: CPT

## 2020-05-08 PROCEDURE — 85730 THROMBOPLASTIN TIME PARTIAL: CPT

## 2020-05-08 PROCEDURE — 81001 URINALYSIS AUTO W/SCOPE: CPT

## 2020-05-08 RX ORDER — METOCLOPRAMIDE HCL 10 MG
10 TABLET ORAL ONCE
Refills: 0 | Status: COMPLETED | OUTPATIENT
Start: 2020-05-08 | End: 2020-05-08

## 2020-05-08 RX ORDER — KETOROLAC TROMETHAMINE 30 MG/ML
15 SYRINGE (ML) INJECTION ONCE
Refills: 0 | Status: DISCONTINUED | OUTPATIENT
Start: 2020-05-08 | End: 2020-05-08

## 2020-05-08 RX ORDER — SODIUM CHLORIDE 9 MG/ML
1000 INJECTION INTRAMUSCULAR; INTRAVENOUS; SUBCUTANEOUS ONCE
Refills: 0 | Status: COMPLETED | OUTPATIENT
Start: 2020-05-08 | End: 2020-05-08

## 2020-05-08 RX ORDER — ACETAMINOPHEN 500 MG
650 TABLET ORAL ONCE
Refills: 0 | Status: COMPLETED | OUTPATIENT
Start: 2020-05-08 | End: 2020-05-08

## 2020-05-08 RX ADMIN — Medication 10 MILLIGRAM(S): at 12:15

## 2020-05-08 RX ADMIN — SODIUM CHLORIDE 1000 MILLILITER(S): 9 INJECTION INTRAMUSCULAR; INTRAVENOUS; SUBCUTANEOUS at 12:14

## 2020-05-08 RX ADMIN — Medication 650 MILLIGRAM(S): at 12:15

## 2020-05-08 NOTE — ED PROVIDER NOTE - OBJECTIVE STATEMENT
Camilo CONTRERAS MD PGY2: 30 F hx vaginal delivery 3 weeks ago of healthy infant and hx of headaches intermittent since 2018 here for intermittent similar headaches x 3 days and high blood pressure to 149/92 at home max. Called OBGYN clinic and was instructed to come to ED for preeclampsia eval. No weakness, numbness, or new neurologic symptoms. No fevers, chills, or neck pain. No complications after delivery.

## 2020-05-08 NOTE — ED ADULT NURSE NOTE - OBJECTIVE STATEMENT
Pt is a 31 y/o F, PMH NVD 3 weeks ago and intermittent HA (controlled with Tylenol) since 2018, c/o HA x 3 days and HTN (142/98) x 1 day. Pt is A&Ox3, ambulates independently, NAD, moves all extremities. Pt states that her headaches are intermittent, denies visual changes, and states that it they are worsened with activity. Pt did not take Tylenol for her HA today. Pt was instructed to come to the ED by OBGYN for preeclampsia evaluation. Pt denies dizziness, chest pain, palpitations, cough, SOB, abdominal pain, n/v/d, urinary symptoms, fevers, chills, weakness at this time. Pt resting in bed comfortably with call bell at bedside, safety maintained.

## 2020-05-08 NOTE — ED PROVIDER NOTE - NSFOLLOWUPCLINICS_GEN_ALL_ED_FT
[facility]  Neurology  [address] [place] [address] [address]rd [place] [address]  Phone: [phone]  Fax:   Follow Up Time: 1-3 Days

## 2020-05-08 NOTE — ED PROVIDER NOTE - PROGRESS NOTE DETAILS
Spoke to OBGYN. Will see patient. Camilo CONTRERAS MD PGY2: Patient's HA 4/10, improved from 8/10 earlier. Neuro exam continues to be normal. No need for head CT at this time. Patient has been seen by neurology here before. Comfortable following up outpt. Awaiting rpt BP in 20 minutes per OB rec prior to likely discharge. Camilo CONTRERAS MD PGY2: Spoke to OBGYN, cleared to be dc'd home. Neuro follow-up recommended.

## 2020-05-08 NOTE — CONSULT NOTE ADULT - ASSESSMENT
30y   status post   with h/o cHTN and migraines here with complaint of HA and mild range BPs and concern for PP PEC.     BP elevated >140/90 however not in severe range.  HELLP labs all wnl.  Patient currently with no signs/symptoms of sPEC.  Reasonable to send patient home with strict PEC precautions- return if any severe HA not improved with PO pain medication, vision changes, intractable n/v, severe epigastric or RUQ pain or any concern for abnormal swelling.  Patient additionally instructed to take BP at home w/ home BP cuff     2/day and to report to her physician if any readings are elevated >160/110.  The diagnosis of cHTN  vs. pre-eclampsia vs. severe pre-eclampsia was explained to patient.  Explained precautions above.  All questions/concerns of patient addressed.      In the setting of normal to mild range BPs normal HELLP labs and HA symptoms consistent with those of patient's migraine/cluster HA, low suspicion for preeclampsia at this time. Patient's HA appears to be consistent with her usual migraine/cluster HAs. Recommend patient to restart her magnesium oxide which she was previously taking for her HAs along with Tylenol prn. Consider Reglan and Benadryl as well. Consider neurology consult if patient's HA persists. She should also have follow up with neurology.

## 2020-05-08 NOTE — ED ADULT NURSE REASSESSMENT NOTE - NS ED NURSE REASSESS COMMENT FT1
Pt BP improved to 106/71. Pt states feeling more comfortable than when she arrived. Pt is resting comfortably in bed, safety maintained.

## 2020-05-08 NOTE — ED PROVIDER NOTE - PHYSICAL EXAMINATION
Camilo CONTRERAS MD PGY2:   PHYSICAL EXAM:    GENERAL: NAD, well-developed  HEENT:  Atraumatic, Normocephalic  CHEST/LUNG: Chest rise equal bilaterally.   HEART: Regular rate and rhythm  ABDOMEN: Soft, Nontender, Nondistended  EXTREMITIES:  2+ Peripheral Pulses.  PSYCH: A&Ox3  SKIN: No obvious rashes or lesions  Neuro: Strength 5/5 in bilateral upper and lower extremities. Sensation intact over all extremities. CN 2 - 12 intact. Patient ambulating without difficulty without assistance.

## 2020-05-08 NOTE — ED ADULT NURSE NOTE - NSIMPLEMENTINTERV_GEN_ALL_ED
Implemented All Universal Safety Interventions:  [place] to call system. Call bell, personal items and telephone within reach. Instruct patient to call for assistance. Room bathroom lighting operational. Non-slip footwear when patient is off stretcher. Physically safe environment: no spills, clutter or unnecessary equipment. Stretcher in lowest position, wheels locked, appropriate side rails in place.

## 2020-05-08 NOTE — ED ADULT NURSE NOTE - CHPI ED NUR SYMPTOMS NEG
no nausea/no tingling/no weakness/no vomiting/no dizziness/no chills/no fever/no decreased eating/drinking

## 2020-05-08 NOTE — ED PROVIDER NOTE - PATIENT PORTAL LINK FT
You can access the FollowMyHealth Patient Portal offered by [facility] by registering at the following website: [email or web address]. By joining [employer]’s FollowMyHealth portal, you will also be able to view your health information using other applications (apps) compatible with our system.

## 2020-05-08 NOTE — CONSULT NOTE ADULT - SUBJECTIVE AND OBJECTIVE BOX
HPI:  29yo  PPD#17 from vaginal delivery with prenatal course complicated by cHTN, GDMA2, and ICP referred to ED after telehealth [facility] appt for evaluation of HAx3d. Of note pt has h/o migraines/cluster HA since 2018 and has been following with neurology throughout pregnancy for treatment of HAs. Today pt p/w complaint of occipital HA that radiates over top of head to behind L eye and is 7-8/10 severity. HA is throbbing in nature and improved with Tylenol. Endorses intermittent left eye blurry vision/double vision and light in her vision which she often has with her HAs in the past. Per pt this HA is similar to those she has been having since 2018. +photophobia/phonophobia. Denies weakness/tingling, dizziness/lightheadedness, nausea/vomiting, RUQ/epigastric pain, fever/chills, cp/sob.     of note, pthas previously had improvement of her HAs with magnesium oxide which was prescribed by neurology however she stopped taking it postpartum. She also had symptom relief with a L occipital nerve block in 2019 and she has been planning to followup with neurology for further treatment/evaluation of her HA but due to COVID she has been unable to obtain postpartum followup.       prenatal course: ICP, cHTN (no meds), GDMA2    OB/GYN HISTORY:   . FT  x3 ( -c/b ICP,  - c//b ICP,  - c/b ICP), PPROM  2013  Ectopic pregnanacy status post MTX/D&C   denies fibroids, endometriosis, STIs    PAST MEDICAL & SURGICAL HISTORY:  Migraines  Ectopic pregnancy  H/O tubal ligation    Allergies    No Known Allergies    Intolerances      Prescriptions:    MEDICATIONS  (STANDING):  ketorolac   Injectable 15 milliGRAM(s) IV Push Once    MEDICATIONS  (PRN):    FAMILY HISTORY:  No pertinent family history in first degree relatives      SOCIAL HISTORY:   denies etoh abuse, denies tobacco use, denies drug use.          Vital Signs Last 24 Hrs  T(C): 36.9 (08 May 2020 11:00), Max: 36.9 (08 May 2020 11:00)  T(F): 98.5 (08 May 2020 11:00), Max: 98.5 (08 May 2020 11:00)  HR: 85 (08 May 2020 11:00) (85 - 92)  BP: 110/75 (08 May 2020 12:44) (110/75 - 149/91)  BP(mean): --  RR: 18 (08 May 2020 11:00) (16 - 18)  SpO2: 99% (08 May 2020 11:00) (98% - 99%)    PHYSICAL EXAM:      Constitutional: alert and oriented x 3  Respiratory: no increased wob   Cardiovascular: regular rate and rhythm, no murmur  Gastrointestinal: soft, non tender, non-distended, fundus firm, no RUQ/epigastric pain,  no rebound/guarding, + bowel sounds. No organomegaly, no palpable masses  Pelvic: no vaginal bleeding on pad   Extremities: Non-tender bilaterally, No edema  Neurological: CN 2-12 intact. +MS x4, +sensation x4            LABS:                        13.6   4.88  )-----------( 275      ( 08 May 2020 11:50 )             42.0     05-08    141  |  103  |  7   ----------------------------<  89  4.1   |  24  |  0.62    Ca    9.7      08 May 2020 11:51    TPro  7.3  /  Alb  4.4  /  TBili  0.4  /  DBili  x   /  AST  28  /  ALT  27  /  AlkPhos  102  05-08    PT/INR - ( 08 May 2020 11:51 )   PT: 12.9 sec;   INR: 1.12 ratio         PTT - ( 08 May 2020 11:51 )  PTT:33.5 sec  Urinalysis Basic - ( 08 May 2020 11:50 )    Color: Yellow / Appearance: Clear / S.018 / pH: x  Gluc: x / Ketone: Negative  / Bili: Negative / Urobili: Negative   Blood: x / Protein: Negative / Nitrite: Negative   Leuk Esterase: Large / RBC: 4 /hpf / WBC 11 /HPF   Sq Epi: x / Non Sq Epi: 2 /hpf / Bacteria: Negative        Blood Type: B Positive      RADIOLOGY & ADDITIONAL STUDIES:

## 2020-05-08 NOTE — ED PROVIDER NOTE - NSFOLLOWUPINSTRUCTIONS_ED_ALL_ED_FT
You were seen in the ED for concern for preeclampsia because of your blood pressure and headache.     You were seen by OBGYN. They do not believe your headache and blood pressure represent preeclampsia.     Your headache improved with tylenol and metaclopramide. You may take tylenol 1000mg and ibuprofen 400mg every 6 hours as neeeded at home. Please call our neurology clinic to set up an appointment.     Please return to the ED for worsening headache, nausea, vomiting, weakness, numbness, or any other symptoms that concern you.    Usted fue visto en el servicio de urgencias por preocupación por preeclampsia debido a ball presión arterial y dolor de kristi.    Usted fue visto por OBGYN. No creen que ball dolor de kristi y presión arterial representen preeclampsia.    Ball dolor de kristi mejoró con tylenol y metaclopramida. Puede sal 1000 mg de tylenol e 400 mg de ibuprofeno cada 6 horas según sea necesario en casa. Llame a nuestra clínica de neurología para programar nazanin kami.    Regrese al servicio de urgencias para empeorar el dolor de kristi, las náuseas, los vómitos, la debilidad, el entumecimiento o cualquier otro síntoma que le preocupe.

## 2020-05-08 NOTE — ED PROVIDER NOTE - ATTENDING CONTRIBUTION TO CARE
Attending MD Jj:  I personally have seen and examined this patient.  Resident note reviewed and agree on plan of care and except where noted.  See HPI, PE, and MDM for details.    **THIS PATIENT WAS EVALUATED AND TREATED DURING THE COVID-19 PANDEMIC IN [place], [place]**      30F sp  2 weeks prior presenting with acute on chronic generalized headache pain and reportedly elevated BP, referred to ED to r/o post-partum preeclampsia. BP 120s/70s here, non focal neuro exam. Pre-eclampsia is doubtful but will obtain screening labs and OB consultation. Headaches seem migrainous in nature, no need for urgent neuro-imaging given headache intermittent since 2018 and presumptive dx is migraines. Will provide patient with outpatient neurology follow up if investigations are unrevealing in ED

## 2020-05-08 NOTE — CONSULT NOTE ADULT - ATTENDING COMMENTS
Pt seen in ER w resident.  Pt feeling better after receiving reglan.  /77  No LE edema; +2 reflexes  D/C home;  Will f/u in [facility] as scheduled in 3 wks

## 2020-05-08 NOTE — CONSULT NOTE ADULT - PROBLEM SELECTOR RECOMMENDATION 9
Plan  -recommend tylenol, magnesium oxide, reglan for HA   -consider neurology consult if HA not improved. Pt needs follow up with neurology   -if HA improved then recommend to discharge patient home w/ instructions to take BP x/day  and to notify provider if >160/110.   -patient instructed to follow up with OBGYN as scheduled in 3weeks  -no acute OB intervention at this time     pt seen and d/w Dr. Amrit Anthony PGY3

## 2020-05-08 NOTE — ED PROVIDER NOTE - CLINICAL SUMMARY MEDICAL DECISION MAKING FREE TEXT BOX
Camilo CONTRERAS MD PGY2: 30 F with chronic headaches unchanged that has seen a neurologist prior now with similar headaches with normal neuro exam; also with BP that is mildly high. Patient does not require CT Scan at this time based on normal neuro exam, and patient's headaches dating back two years. OBGYN consulted, HELLP labs sent.

## 2020-05-12 NOTE — HISTORY OF PRESENT ILLNESS
[Postpartum Follow Up] : postpartum follow up [Complications:___] : complications include: [unfilled] [] : delivered by vaginal delivery [Delivery Date: ___] : on [unfilled] [Breastfeeding] : currently nursing [Intended Contraception] : Intended Contraception: [IUD] : intrauterine device [Headache] : headache [S/Sx PP Depression] : no signs/symptoms of postpartum depression [Heavy Bleeding] : no heavy bleeding [Shortness of Breath] : no shortness of breath [Suicidal Ideation] : no suicidal ideation [Dysuria] : no dysuria [Fever] : no fever [Nausea] : no nausea [Vomiting] : no vomiting [de-identified] : pp Mirena inserted 4/21 [de-identified] : deferred 2/2 Telehealth visit

## 2020-05-14 ENCOUNTER — APPOINTMENT (OUTPATIENT)
Dept: MATERNAL FETAL MEDICINE | Facility: CLINIC | Age: 30
End: 2020-05-14
Payer: MEDICAID

## 2020-05-14 ENCOUNTER — OUTPATIENT (OUTPATIENT)
Dept: OUTPATIENT SERVICES | Facility: HOSPITAL | Age: 30
LOS: 1 days | End: 2020-05-14
Payer: MEDICAID

## 2020-05-14 VITALS
BODY MASS INDEX: 23.66 KG/M2 | HEIGHT: 65 IN | SYSTOLIC BLOOD PRESSURE: 142 MMHG | HEART RATE: 87 BPM | OXYGEN SATURATION: 97 % | WEIGHT: 142 LBS | DIASTOLIC BLOOD PRESSURE: 80 MMHG

## 2020-05-14 DIAGNOSIS — Z98.51 TUBAL LIGATION STATUS: Chronic | ICD-10-CM

## 2020-05-14 PROBLEM — R51 HEADACHE: Chronic | Status: ACTIVE | Noted: 2020-05-08

## 2020-05-14 PROCEDURE — G0463: CPT

## 2020-05-14 PROCEDURE — 99213 OFFICE O/P EST LOW 20 MIN: CPT | Mod: TH

## 2020-05-14 NOTE — HISTORY OF PRESENT ILLNESS
[Delivery Date: ___] : on [unfilled] [] : delivered by vaginal delivery [Female] : Delivery History: baby girl [Wt. ___] : weighing [unfilled] [Breastfeeding] : currently nursing [Discharge HCT: ___] : hematocrit level was [unfilled] [Discharge HGB: ___] : hemoglobin level was [unfilled] [Back to Normal] : is back to normal in size [Mild] : mild vaginal bleeding [Normal] : the vagina was normal [Not Done] : Examination of breasts not done [Doing Well] : is doing well [Resumed Menses] : has not resumed her menses [S/Sx PP Depression] : no signs/symptoms of postpartum depression [Cervix Sample Taken] : cervical sample not taken for a Pap smear [FreeTextEntry8] : 3+ wk postpartum c/o IUD is bothering her, vaginal "pinching" & strings protruding [FreeTextEntry9] : cHTN, GDMA2,  ICP.  Tested pos for COVID-19 PCR during preadmission testing on 4/19, and tested pos for COVID-19 IgG ab on 5/7.   Has never been symptomatic.   Pt was sent from clinic to L&D at 5/8 appt for concern for PP PEC.   All PEC labs were normal; low index of suspicion for PEC, more likely migraine/cluster h/a c/w pt's hx. [de-identified] : IOL at 36 4/7 wk for ICP.   Rh pos, rubella imm, rubeola imm.   Got TDap 2/25/2020 [de-identified] : Had h/a last night for the first time in a week.   Severe for a few minutes, then mild, persisting now.   Responded to Tylenol.   Denies n/v, upper abd pain, visual disturbances.   Checking B?Ps at home; reports highest 140/80.   [de-identified] : Denies fever, chills, abd pain. [de-identified] : 142/80    Wt 142 lb    HR 87    Abd:  Soft, nondistended, NT.   VE:   IUD strings noted protruding out of vagina.  No vaginal d/c.   Small amount of dark blood in vag vault.   Cx closed, long, IUD is not protruding.  No CMT.  Nl size uterus, no adnexal tenderness. [de-identified] : 31 yo , 3+ wk s/p  after IOL at 36 wk for ICP.   Hx cHTN, GDMA2.   PP PEC was ruled out at ED visit on , now with h/a more likely migraine/cluster.  Acceptable B/P control today, not on meds.   IUD strings noted protruding from the vagina, but no evidence of expulsion on exam. [de-identified] : Snipped IUD strings and left 1.5 - 2 cm remaining distal to cervical os.   RTC 3-4 weeks for postpartum visit.   Pt to schedule PCP in 2-3 weeks for B/P f/u and also neuro f/u.  Pt to come to n.v. fasting for 2 hr PP GTT    Given > 2 weeks since testing pos for COVID-19 PCR and being asymptomatic, pt no longer has to mask at home.  FERNANDO David   D/W Dr. Ferreira

## 2020-06-01 ENCOUNTER — OUTPATIENT (OUTPATIENT)
Dept: OUTPATIENT SERVICES | Facility: HOSPITAL | Age: 30
LOS: 1 days | End: 2020-06-01
Payer: MEDICAID

## 2020-06-01 DIAGNOSIS — Z98.51 TUBAL LIGATION STATUS: Chronic | ICD-10-CM

## 2020-06-03 DIAGNOSIS — Z71.89 OTHER SPECIFIED COUNSELING: ICD-10-CM

## 2020-06-09 ENCOUNTER — APPOINTMENT (OUTPATIENT)
Dept: MATERNAL FETAL MEDICINE | Facility: CLINIC | Age: 30
End: 2020-06-09
Payer: MEDICAID

## 2020-06-09 ENCOUNTER — OUTPATIENT (OUTPATIENT)
Dept: OUTPATIENT SERVICES | Facility: HOSPITAL | Age: 30
LOS: 1 days | End: 2020-06-09
Payer: MEDICAID

## 2020-06-09 VITALS
HEART RATE: 80 BPM | HEIGHT: 65 IN | DIASTOLIC BLOOD PRESSURE: 70 MMHG | SYSTOLIC BLOOD PRESSURE: 120 MMHG | OXYGEN SATURATION: 97 % | TEMPERATURE: 98.3 F

## 2020-06-09 DIAGNOSIS — Z98.51 TUBAL LIGATION STATUS: Chronic | ICD-10-CM

## 2020-06-09 DIAGNOSIS — Z30.432 ENCOUNTER FOR REMOVAL OF INTRAUTERINE CONTRACEPTIVE DEVICE: ICD-10-CM

## 2020-06-09 LAB
BILIRUB UR QL STRIP: NORMAL
GLUCOSE UR-MCNC: NORMAL
HCG UR QL: 0.2 EU/DL
HGB UR QL STRIP.AUTO: NORMAL
KETONES UR-MCNC: NORMAL
LEUKOCYTE ESTERASE UR QL STRIP: NORMAL
NITRITE UR QL STRIP: NORMAL
PH UR STRIP: 5.5
PROT UR STRIP-MCNC: NORMAL
SP GR UR STRIP: 1.02

## 2020-06-09 PROCEDURE — 99213 OFFICE O/P EST LOW 20 MIN: CPT | Mod: TH,25

## 2020-06-09 PROCEDURE — 36415 COLL VENOUS BLD VENIPUNCTURE: CPT

## 2020-06-09 PROCEDURE — G0463: CPT

## 2020-06-09 PROCEDURE — 36415 COLL VENOUS BLD VENIPUNCTURE: CPT | Mod: NC

## 2020-06-09 PROCEDURE — 81025 URINE PREGNANCY TEST: CPT

## 2020-06-09 PROCEDURE — 82951 GLUCOSE TOLERANCE TEST (GTT): CPT

## 2020-06-10 DIAGNOSIS — O09.899 SUPERVISION OF OTHER HIGH RISK PREGNANCIES, UNSPECIFIED TRIMESTER: ICD-10-CM

## 2020-06-12 NOTE — HISTORY OF PRESENT ILLNESS
[Complications:___] : complications include: [unfilled] [Delivery Date: ___] : on [unfilled] [] : delivered by vaginal delivery [Female] : Delivery History: baby girl [Pertussis Vaccine] : Pertussis vaccine administered [Discharge HCT: ___] : hematocrit level was [unfilled] [Discharge HGB: ___] : hemoglobin level was [unfilled] [None] : No associated symptoms are reported [Back to Normal] : is back to normal in size [Awake] : awake [Alert] : alert [Nipple Discharge] : nipple discharge [Examination Of The Breasts] : a normal appearance [No Masses] : no breast masses were palpable [Soft] : soft [Labia Majora] : labia major [Labia Minora] : labia minora [Normal] : clitoris [Pink Rugae] : pink rugae [Moderate] : there was moderate vaginal bleeding [IUD String] : had an IUD string protruding out [Normal Position] : in a normal position [Nl Sphincter Tone] : normal sphincter tone [Doing Well] : is doing well [Rubella Vaccine] : Rubella vaccine was not administered [BTL] : no tubal ligation [Rhogam] : Rhogam was not administered [Cervix Sample Taken] : cervical sample not taken for a Pap smear [LAD] : no lymphadenopathy [Acute Distress] : no acute distress [Thyroid Nodule] : no thyroid nodule [Goiter] : no goiter [Mass] : no breast mass [Axillary LAD] : no axillary lymphadenopathy [Axillary Lymph Nodes Enlarged Bilaterally] : no enlarged nodes [Tender] : non tender [Discharge] : had no discharge [Distended] : not distended [H/Smegaly] : no hepatosplenomegaly [Tenderness] : nontender [Motion Tenderness] : there was no cervical motion tenderness [Pap Obtained] : a Pap smear was not performed [Adnexa Tenderness] : were not tender [Mass ___ cm] : no uterine mass was palpated [Enlarged ___ wks] : not enlarged [Ovarian Mass (___ Cm)] : there were no adnexal masses [de-identified] : IUD  expelling   [de-identified] : crampy pelvic pain and persistent vag bleeding   [FreeTextEntry5] : IUD string long  IUD  TIP palpable in Cervix  Sono done confirmed IUD in Cervix as well

## 2020-12-17 ENCOUNTER — RX RENEWAL (OUTPATIENT)
Age: 30
End: 2020-12-17

## 2020-12-21 PROBLEM — N76.0 BACTERIAL VAGINITIS: Status: RESOLVED | Noted: 2019-11-06 | Resolved: 2020-12-21

## 2020-12-23 PROBLEM — Z86.19 HISTORY OF CANDIDIASIS OF VAGINA: Status: RESOLVED | Noted: 2020-03-03 | Resolved: 2020-12-23

## 2021-06-14 ENCOUNTER — LABORATORY RESULT (OUTPATIENT)
Age: 31
End: 2021-06-14

## 2021-06-15 ENCOUNTER — OUTPATIENT (OUTPATIENT)
Dept: OUTPATIENT SERVICES | Facility: HOSPITAL | Age: 31
LOS: 1 days | End: 2021-06-15
Payer: MEDICAID

## 2021-06-15 ENCOUNTER — APPOINTMENT (OUTPATIENT)
Dept: OBGYN | Facility: CLINIC | Age: 31
End: 2021-06-15
Payer: MEDICAID

## 2021-06-15 ENCOUNTER — LABORATORY RESULT (OUTPATIENT)
Age: 31
End: 2021-06-15

## 2021-06-15 VITALS — SYSTOLIC BLOOD PRESSURE: 122 MMHG | BODY MASS INDEX: 22.63 KG/M2 | DIASTOLIC BLOOD PRESSURE: 80 MMHG | WEIGHT: 136 LBS

## 2021-06-15 VITALS — TEMPERATURE: 97.8 F

## 2021-06-15 DIAGNOSIS — N91.1 SECONDARY AMENORRHEA: ICD-10-CM

## 2021-06-15 DIAGNOSIS — Z01.419 ENCOUNTER FOR GYNECOLOGICAL EXAMINATION (GENERAL) (ROUTINE) W/OUT ABNORMAL FINDINGS: ICD-10-CM

## 2021-06-15 DIAGNOSIS — Z98.51 TUBAL LIGATION STATUS: Chronic | ICD-10-CM

## 2021-06-15 DIAGNOSIS — Z01.419 ENCOUNTER FOR GYNECOLOGICAL EXAMINATION (GENERAL) (ROUTINE) WITHOUT ABNORMAL FINDINGS: ICD-10-CM

## 2021-06-15 DIAGNOSIS — N76.0 ACUTE VAGINITIS: ICD-10-CM

## 2021-06-15 PROCEDURE — 81025 URINE PREGNANCY TEST: CPT

## 2021-06-15 PROCEDURE — 84146 ASSAY OF PROLACTIN: CPT

## 2021-06-15 PROCEDURE — 88175 CYTOPATH C/V AUTO FLUID REDO: CPT

## 2021-06-15 PROCEDURE — 84443 ASSAY THYROID STIM HORMONE: CPT

## 2021-06-15 PROCEDURE — 83001 ASSAY OF GONADOTROPIN (FSH): CPT

## 2021-06-15 PROCEDURE — 87491 CHLMYD TRACH DNA AMP PROBE: CPT

## 2021-06-15 PROCEDURE — 99395 PREV VISIT EST AGE 18-39: CPT | Mod: GC

## 2021-06-15 PROCEDURE — 87591 N.GONORRHOEAE DNA AMP PROB: CPT

## 2021-06-15 PROCEDURE — G0463: CPT | Mod: 25

## 2021-06-15 PROCEDURE — 87624 HPV HI-RISK TYP POOLED RSLT: CPT

## 2021-06-15 NOTE — HISTORY OF PRESENT ILLNESS
[Good] : being in good health [Reproductive Age] : is of reproductive age [de-identified] : h/o cHTN/GDMA2 previous 2 pregnancies - pt reports seeing IM outside of health system [Approximately ___ (Month)] : the LMP was approximately [unfilled] month(s) ago [Sexually Active] : is sexually active [Monogamous] : is monogamous

## 2021-06-16 LAB
C TRACH RRNA SPEC QL NAA+PROBE: SIGNIFICANT CHANGE UP
FSH SERPL-MCNC: 3.2 IU/L — SIGNIFICANT CHANGE UP
HPV HIGH+LOW RISK DNA PNL CVX: SIGNIFICANT CHANGE UP
N GONORRHOEA RRNA SPEC QL NAA+PROBE: SIGNIFICANT CHANGE UP
PROLACTIN SERPL-MCNC: 4 NG/ML — SIGNIFICANT CHANGE UP (ref 3.4–24.1)
SPECIMEN SOURCE: SIGNIFICANT CHANGE UP
T4 FREE+ TSH PNL SERPL: 1.38 UIU/ML — SIGNIFICANT CHANGE UP (ref 0.27–4.2)

## 2021-07-01 ENCOUNTER — OUTPATIENT (OUTPATIENT)
Dept: OUTPATIENT SERVICES | Facility: HOSPITAL | Age: 31
LOS: 1 days | End: 2021-07-01
Payer: MEDICAID

## 2021-07-01 ENCOUNTER — APPOINTMENT (OUTPATIENT)
Dept: OBGYN | Facility: CLINIC | Age: 31
End: 2021-07-01
Payer: MEDICAID

## 2021-07-01 ENCOUNTER — LABORATORY RESULT (OUTPATIENT)
Age: 31
End: 2021-07-01

## 2021-07-01 VITALS — DIASTOLIC BLOOD PRESSURE: 80 MMHG | WEIGHT: 138.5 LBS | BODY MASS INDEX: 23.05 KG/M2 | SYSTOLIC BLOOD PRESSURE: 122 MMHG

## 2021-07-01 DIAGNOSIS — Z98.51 TUBAL LIGATION STATUS: Chronic | ICD-10-CM

## 2021-07-01 DIAGNOSIS — N76.0 ACUTE VAGINITIS: ICD-10-CM

## 2021-07-01 DIAGNOSIS — B96.89 ACUTE VAGINITIS: ICD-10-CM

## 2021-07-01 DIAGNOSIS — L29.9 PRURITUS, UNSPECIFIED: ICD-10-CM

## 2021-07-01 DIAGNOSIS — N89.8 OTHER SPECIFIED NONINFLAMMATORY DISORDERS OF VAGINA: ICD-10-CM

## 2021-07-01 PROCEDURE — 87800 DETECT AGNT MULT DNA DIREC: CPT

## 2021-07-01 PROCEDURE — 99213 OFFICE O/P EST LOW 20 MIN: CPT

## 2021-07-01 PROCEDURE — G0463: CPT

## 2021-07-01 RX ORDER — BLOOD PRESSURE TEST KIT-LARGE
KIT MISCELLANEOUS
Qty: 1 | Refills: 0 | Status: DISCONTINUED | COMMUNITY
Start: 2019-12-03 | End: 2021-07-01

## 2021-07-01 RX ORDER — METFORMIN ER 500 MG 500 MG/1
500 TABLET ORAL
Qty: 120 | Refills: 1 | Status: DISCONTINUED | COMMUNITY
Start: 2020-02-11 | End: 2021-07-01

## 2021-07-01 RX ORDER — BLOOD-GLUCOSE METER
W/DEVICE KIT MISCELLANEOUS
Qty: 1 | Refills: 0 | Status: DISCONTINUED | COMMUNITY
Start: 2019-12-03 | End: 2021-07-01

## 2021-07-01 RX ORDER — LANCETS 33 GAUGE
EACH MISCELLANEOUS
Qty: 1 | Refills: 6 | Status: DISCONTINUED | COMMUNITY
Start: 2019-12-03 | End: 2021-07-01

## 2021-07-01 RX ORDER — METRONIDAZOLE 500 MG/1
500 TABLET ORAL TWICE DAILY
Qty: 14 | Refills: 0 | Status: DISCONTINUED | COMMUNITY
Start: 2019-11-06 | End: 2021-07-01

## 2021-07-01 RX ORDER — NYSTATIN AND TRIAMCINOLONE ACETONIDE 100000; 1 MG/G; MG/G
100000-0.1 CREAM TOPICAL TWICE DAILY
Qty: 1 | Refills: 0 | Status: COMPLETED | COMMUNITY
Start: 2021-07-01 | End: 2021-07-15

## 2021-07-01 RX ORDER — INFLUENZA VIRUS VACCINE 15; 15; 15; 15 UG/.5ML; UG/.5ML; UG/.5ML; UG/.5ML
0.5 SUSPENSION INTRAMUSCULAR
Refills: 0 | Status: DISCONTINUED | COMMUNITY
Start: 2019-12-03 | End: 2021-07-01

## 2021-07-01 RX ORDER — FLUCONAZOLE 150 MG/1
150 TABLET ORAL
Qty: 1 | Refills: 0 | Status: DISCONTINUED | COMMUNITY
Start: 2020-03-03 | End: 2021-07-01

## 2021-07-01 RX ORDER — LANCETS 33 GAUGE
EACH MISCELLANEOUS
Qty: 4 | Refills: 1 | Status: DISCONTINUED | COMMUNITY
Start: 2020-02-04 | End: 2021-07-01

## 2021-07-01 RX ORDER — INSULIN HUMAN 100 [IU]/ML
100 INJECTION, SUSPENSION SUBCUTANEOUS
Qty: 1 | Refills: 1 | Status: DISCONTINUED | COMMUNITY
Start: 2020-03-13 | End: 2021-07-01

## 2021-07-01 RX ORDER — BLOOD SUGAR DIAGNOSTIC
STRIP MISCELLANEOUS 4 TIMES DAILY
Qty: 4 | Refills: 0 | Status: DISCONTINUED | COMMUNITY
Start: 2020-02-04 | End: 2021-07-01

## 2021-07-01 RX ORDER — URINE GLUCOSE-ACET TEST STRIP
STRIP MISCELLANEOUS
Qty: 30 | Refills: 0 | Status: DISCONTINUED | COMMUNITY
Start: 2020-03-30 | End: 2021-07-01

## 2021-07-01 RX ORDER — SYRINGE-NEEDLE,INSULIN,0.5 ML 31 GX5/16"
31G X 5/16" SYRINGE, EMPTY DISPOSABLE MISCELLANEOUS
Qty: 2 | Refills: 1 | Status: DISCONTINUED | COMMUNITY
Start: 2020-03-10 | End: 2021-07-01

## 2021-07-01 RX ORDER — INSULIN LISPRO 100 [IU]/ML
100 INJECTION, SOLUTION INTRAVENOUS; SUBCUTANEOUS
Qty: 1 | Refills: 1 | Status: DISCONTINUED | COMMUNITY
Start: 2020-03-10 | End: 2021-07-01

## 2021-07-01 RX ORDER — PEN NEEDLE, DIABETIC 29 G X1/2"
32G X 4 MM NEEDLE, DISPOSABLE MISCELLANEOUS
Qty: 1 | Refills: 1 | Status: DISCONTINUED | COMMUNITY
Start: 2020-03-13 | End: 2021-07-01

## 2021-07-01 RX ORDER — BLOOD-GLUCOSE METER
KIT MISCELLANEOUS
Qty: 2 | Refills: 5 | Status: DISCONTINUED | COMMUNITY
Start: 2019-12-03 | End: 2021-07-01

## 2021-07-01 RX ORDER — MAGNESIUM OXIDE 241.3 MG/1000MG
400 TABLET ORAL TWICE DAILY
Qty: 60 | Refills: 5 | Status: DISCONTINUED | COMMUNITY
Start: 2019-12-10 | End: 2021-07-01

## 2021-07-01 NOTE — COUNSELING
[STD (testing, results, tx)] : STD (testing, results, tx) [Medication Management] : medication management

## 2021-07-02 LAB
CANDIDA AB TITR SER: SIGNIFICANT CHANGE UP
G VAGINALIS DNA SPEC QL NAA+PROBE: DETECTED
T VAGINALIS SPEC QL WET PREP: SIGNIFICANT CHANGE UP

## 2021-07-02 NOTE — PHYSICAL EXAM
[Appropriately responsive] : appropriately responsive [Alert] : alert [No Acute Distress] : no acute distress [No Lesions] : no lesions  [Labia Majora] : normal [Labia Minora] : normal [Normal] : normal [Uterine Adnexae] : non-palpable [FreeTextEntry5] : No cervical motion tenderness.

## 2021-07-02 NOTE — HISTORY OF PRESENT ILLNESS
[FreeTextEntry1] : Ms. Sales is a 32 y/o  who presents to clinic for vaginal discharge, irritation, itching, and odor. \par Symptoms starts on  after completion on her first period after breastfeeding cessation. Pt describes the discharge as thick and yellow, heavy enough to require a daily pad.  Pt denies dysuria, dyspareunia, or burning sensation. Pt denies recent AB use or any additional medications. Pt has history of BV and Candida in pregnancy. History of GDM.  \par \par  [GonorrheaDate] : 6/15/21 [TextBox_63] : Neg [ChlamydiaDate] : 6/15/21 [TextBox_68] : Neg  [LMPDate] : 06/17/21 [MensesFreq] : 3 [MensesLength] : 3

## 2021-07-02 NOTE — REASON FOR VISIT
[Acute] : an acute visit for [Vulvar/Vaginal Complaint] : vulvar/vaginal complaint [Time Spent: ____ minutes] : Total time spent using  services: [unfilled] minutes. The patient's primary language is not English thus required  services. [FreeTextEntry1] : #[id number] [TWNoteComboBox1] : Citizen of [place]

## 2021-07-02 NOTE — PLAN
[FreeTextEntry1] : - Swab for vaginitis panel was preformed. Informed pt that results should be available next week. If necessary will call in appropriate medications after results are given.\par - Ordered Mycolog ointment for vulvar itching. Apply twice daily for 1-2 weeks.

## 2021-07-05 PROBLEM — N76.0 BACTERIAL VAGINITIS: Status: ACTIVE | Noted: 2021-07-05

## 2021-07-13 DIAGNOSIS — L29.9 PRURITUS, UNSPECIFIED: ICD-10-CM

## 2021-07-13 DIAGNOSIS — N89.8 OTHER SPECIFIED NONINFLAMMATORY DISORDERS OF VAGINA: ICD-10-CM

## 2021-11-01 NOTE — DISCHARGE NOTE OB - IF BREASTFEEDING, ADD A TOTAL OF 500 EXTRA CALORIES EACH DAY
Lonnie Rodriguez,    I just saw Ms. Mary Rick back in the office after her laparoscopic appendectomy. She is doing well and incisions are healing appropriately. She is going to follow up with me as needed. Thank you for allowing me to take care of Ms. Ricarda Trevino with you.     Jeronimo Rand Statement Selected

## 2023-04-11 ENCOUNTER — MED ADMIN CHARGE (OUTPATIENT)
Age: 33
End: 2023-04-11

## 2023-04-11 ENCOUNTER — OUTPATIENT (OUTPATIENT)
Dept: OUTPATIENT SERVICES | Facility: HOSPITAL | Age: 33
LOS: 1 days | End: 2023-04-11
Payer: MEDICAID

## 2023-04-11 ENCOUNTER — LABORATORY RESULT (OUTPATIENT)
Age: 33
End: 2023-04-11

## 2023-04-11 ENCOUNTER — RESULT REVIEW (OUTPATIENT)
Age: 33
End: 2023-04-11

## 2023-04-11 ENCOUNTER — APPOINTMENT (OUTPATIENT)
Dept: OBGYN | Facility: CLINIC | Age: 33
End: 2023-04-11
Payer: MEDICAID

## 2023-04-11 VITALS — BODY MASS INDEX: 24.96 KG/M2 | SYSTOLIC BLOOD PRESSURE: 120 MMHG | DIASTOLIC BLOOD PRESSURE: 80 MMHG | WEIGHT: 150 LBS

## 2023-04-11 DIAGNOSIS — N76.0 ACUTE VAGINITIS: ICD-10-CM

## 2023-04-11 DIAGNOSIS — Z11.3 ENCOUNTER FOR SCREENING FOR INFECTIONS WITH A PREDOMINANTLY SEXUAL MODE OF TRANSMISSION: ICD-10-CM

## 2023-04-11 LAB — HIV 1+2 AB+HIV1 P24 AG SERPL QL IA: SIGNIFICANT CHANGE UP

## 2023-04-11 PROCEDURE — 86780 TREPONEMA PALLIDUM: CPT

## 2023-04-11 PROCEDURE — 99213 OFFICE O/P EST LOW 20 MIN: CPT | Mod: 25

## 2023-04-11 PROCEDURE — 81025 URINE PREGNANCY TEST: CPT

## 2023-04-11 PROCEDURE — 86803 HEPATITIS C AB TEST: CPT

## 2023-04-11 PROCEDURE — 87591 N.GONORRHOEAE DNA AMP PROB: CPT

## 2023-04-11 PROCEDURE — 90651 9VHPV VACCINE 2/3 DOSE IM: CPT

## 2023-04-11 PROCEDURE — T1013: CPT

## 2023-04-11 PROCEDURE — 87491 CHLMYD TRACH DNA AMP PROBE: CPT

## 2023-04-11 PROCEDURE — 36415 COLL VENOUS BLD VENIPUNCTURE: CPT

## 2023-04-11 PROCEDURE — 87389 HIV-1 AG W/HIV-1&-2 AB AG IA: CPT

## 2023-04-11 PROCEDURE — G0463: CPT

## 2023-04-11 PROCEDURE — 87521 HEPATITIS C PROBE&RVRS TRNSC: CPT

## 2023-04-11 PROCEDURE — 90471 IMMUNIZATION ADMIN: CPT | Mod: NC

## 2023-04-11 PROCEDURE — 90471 IMMUNIZATION ADMIN: CPT

## 2023-04-11 PROCEDURE — 87340 HEPATITIS B SURFACE AG IA: CPT

## 2023-04-11 RX ORDER — NORETHINDRONE 0.35 MG/1
0.35 TABLET ORAL
Qty: 1 | Refills: 8 | Status: ACTIVE | COMMUNITY
Start: 2023-04-11 | End: 1900-01-01

## 2023-04-11 NOTE — REASON FOR VISIT
[Annual] : an annual visit. [[employer] ] : provided by [employer]   [Time Spent: ____ minutes] : Total time spent using  services: [unfilled] minutes. The patient's primary language is not English thus required  services. [Interpreters_IDNumber] : [id number] [TWNoteComboBox1] : [place]

## 2023-04-11 NOTE — DISCUSSION/SUMMARY
[FreeTextEntry1] : 33yo P4- annual gyn exam\par \par # pelvic pain\par - intermittent, exam unremarkable \par - [ ]pelvic sono ordered -  if neg, would recommend GI follow up\par \par # HCM\par - pap up to date, pt 2024\par - [ ]Gardasil#1 today\par - [ ]STD screen\par - contraception: discussed options (h/o failed BTL, partner awaiting vasectomy).   In light of h/o migraines with aura and elevated BP with OCPS in the past- would avoid estrogen containing contraceptives.  Pt declined LARCS.  discussed mini-pill.  Pt desires trial.  Discussed decreased efficacy and need to take q 24hrs.  Mey rx sent\par \par RTC 2 months for Gardasil#2\par Kiya elise, pAC

## 2023-04-11 NOTE — HISTORY OF PRESENT ILLNESS
[FreeTextEntry1] : 33yo  (LMP 3/25/23) with h/ failed BTL, presents for annual gyn exam.   Pt reports regular monthly menses.  Partner planning vasectomy- but was told he needs to lose weight prior to procedure.  Pt using condoms but would like interim birth control to cover them while they wait for his surgery.  Pt had IUD in past - had removed 2/2 side effects, and had elevated BP/HA with OCPS.    \par  Pt complaining of intermittent lower pelvic pain (1-2 /week) mostly LLQ.  Not associated with change in bowel or bladder habits.  Pt desires STD screen\par \par - PAP  neg / hpv neg\par - gardasil never, desires\par - contraception: failed BTL, using condoms, desires back up\par

## 2023-04-11 NOTE — PHYSICAL EXAM
[Appropriately responsive] : appropriately responsive [Alert] : alert [No Acute Distress] : no acute distress [No Lymphadenopathy] : no lymphadenopathy [No Murmurs] : no murmurs [Soft] : soft [Non-tender] : non-tender [Non-distended] : non-distended [No HSM] : No HSM [No Lesions] : no lesions [No Mass] : no mass [Oriented x3] : oriented x3 [Examination Of The Breasts] : a normal appearance [No Masses] : no breast masses were palpable [Labia Majora] : normal [Labia Minora] : normal [Pink Rugae] : pink rugae [No Bleeding] : There was no active vaginal bleeding [Normal] : normal [Normal Position] : in a normal position [Uterine Adnexae] : normal [Tenderness] : nontender [Enlarged ___ wks] : not enlarged

## 2023-04-12 LAB
C TRACH RRNA SPEC QL NAA+PROBE: SIGNIFICANT CHANGE UP
HBV SURFACE AG SER-ACNC: SIGNIFICANT CHANGE UP
HCV AB S/CO SERPL IA: 2.16 S/CO — HIGH (ref 0–0.99)
HCV AB SERPL-IMP: ABNORMAL
HCV RNA FLD QL NAA+PROBE: SIGNIFICANT CHANGE UP
HCV RNA SPEC QL PROBE+SIG AMP: SIGNIFICANT CHANGE UP
N GONORRHOEA RRNA SPEC QL NAA+PROBE: SIGNIFICANT CHANGE UP
SPECIMEN SOURCE: SIGNIFICANT CHANGE UP
T PALLIDUM AB TITR SER: NEGATIVE — SIGNIFICANT CHANGE UP

## 2023-04-19 DIAGNOSIS — Z30.9 ENCOUNTER FOR CONTRACEPTIVE MANAGEMENT, UNSPECIFIED: ICD-10-CM

## 2023-04-19 DIAGNOSIS — Z11.3 ENCOUNTER FOR SCREENING FOR INFECTIONS WITH A PREDOMINANTLY SEXUAL MODE OF TRANSMISSION: ICD-10-CM

## 2023-04-19 DIAGNOSIS — R10.2 PELVIC AND PERINEAL PAIN: ICD-10-CM

## 2023-04-19 DIAGNOSIS — Z23 ENCOUNTER FOR IMMUNIZATION: ICD-10-CM

## 2023-04-27 ENCOUNTER — OUTPATIENT (OUTPATIENT)
Dept: OUTPATIENT SERVICES | Facility: HOSPITAL | Age: 33
LOS: 1 days | End: 2023-04-27
Payer: MEDICAID

## 2023-04-27 ENCOUNTER — APPOINTMENT (OUTPATIENT)
Dept: ULTRASOUND IMAGING | Facility: CLINIC | Age: 33
End: 2023-04-27
Payer: MEDICAID

## 2023-04-27 DIAGNOSIS — R10.2 PELVIC AND PERINEAL PAIN: ICD-10-CM

## 2023-04-27 DIAGNOSIS — Z98.51 TUBAL LIGATION STATUS: Chronic | ICD-10-CM

## 2023-04-27 PROCEDURE — 76830 TRANSVAGINAL US NON-OB: CPT

## 2023-04-27 PROCEDURE — 76830 TRANSVAGINAL US NON-OB: CPT | Mod: 26

## 2023-05-01 ENCOUNTER — APPOINTMENT (OUTPATIENT)
Dept: INTERNAL MEDICINE | Facility: CLINIC | Age: 33
End: 2023-05-01
Payer: MEDICAID

## 2023-05-01 ENCOUNTER — OUTPATIENT (OUTPATIENT)
Dept: OUTPATIENT SERVICES | Facility: HOSPITAL | Age: 33
LOS: 1 days | End: 2023-05-01
Payer: MEDICAID

## 2023-05-01 VITALS
HEIGHT: 62.5 IN | BODY MASS INDEX: 26.19 KG/M2 | WEIGHT: 146 LBS | OXYGEN SATURATION: 99 % | HEART RATE: 80 BPM | DIASTOLIC BLOOD PRESSURE: 80 MMHG | SYSTOLIC BLOOD PRESSURE: 118 MMHG

## 2023-05-01 DIAGNOSIS — Z98.51 TUBAL LIGATION STATUS: Chronic | ICD-10-CM

## 2023-05-01 DIAGNOSIS — Z30.9 ENCOUNTER FOR CONTRACEPTIVE MANAGEMENT, UNSPECIFIED: ICD-10-CM

## 2023-05-01 DIAGNOSIS — R10.13 EPIGASTRIC PAIN: ICD-10-CM

## 2023-05-01 DIAGNOSIS — I10 ESSENTIAL (PRIMARY) HYPERTENSION: ICD-10-CM

## 2023-05-01 PROCEDURE — 99204 OFFICE O/P NEW MOD 45 MIN: CPT | Mod: GC

## 2023-05-01 RX ORDER — CAMPHOR 0.45 %
25 GEL (GRAM) TOPICAL
Qty: 30 | Refills: 1 | Status: DISCONTINUED | COMMUNITY
Start: 2020-03-03 | End: 2023-05-01

## 2023-05-01 RX ORDER — MEDROXYPROGESTERONE ACETATE 10 MG/1
10 TABLET ORAL
Qty: 10 | Refills: 6 | Status: DISCONTINUED | COMMUNITY
Start: 2021-06-15 | End: 2023-05-01

## 2023-05-01 RX ORDER — METRONIDAZOLE 7.5 MG/G
0.75 GEL VAGINAL
Qty: 1 | Refills: 2 | Status: DISCONTINUED | COMMUNITY
Start: 2021-07-05 | End: 2023-05-01

## 2023-05-01 NOTE — END OF VISIT
[] : Resident [FreeTextEntry3] : cluster ha fu neuro, restart mag, tylenol prn, ha log. epigastric discomfort h pylori test

## 2023-05-01 NOTE — REVIEW OF SYSTEMS
[Abdominal Pain] : abdominal pain [Nausea] : nausea [Heartburn] : heartburn [Headache] : headache [Dizziness] : dizziness [Negative] : Musculoskeletal [Fever] : no fever [Chills] : no chills [Night Sweats] : no night sweats [Recent Change In Weight] : ~T no recent weight change [Vision Problems] : no vision problems [Earache] : no earache [Hearing Loss] : no hearing loss [Nasal Discharge] : no nasal discharge [Constipation] : no constipation [Diarrhea] : diarrhea [Vomiting] : no vomiting [Melena] : no melena [Fainting] : no fainting [Memory Loss] : no memory loss [Unsteady Walking] : no ataxia

## 2023-05-01 NOTE — HISTORY OF PRESENT ILLNESS
[FreeTextEntry1] : 33F presenting to [facility] care.  [de-identified] : Has been having headaches for 3-4 years, saw a neurologist, got ?botox for headaches, then did not see a doctor for a while due to pandemic. Restarted 3months ago, and became frustrated. Takes tylenol and ibuprofen, which helps a little. Occurs intermittently, but feels HA strong during AM and afternoon, then takes Tylenol at night in order to sleep, takes 4-5x monthly. Associated w/ nausea, dizzines, no effects on vision, positional, associated w/ phonophobia, no photophobia. Experiences headache in b/l temples that expands to whole head, then feels like nails being pushed into head. Describes stress level as normal. \par \par Complaining of epigastric pain and regurgitation of sour liquid when she first wakes up in the morning, associated w/ nausea. Relieved by drinking hot tea, and does not recur later in the day. No vomiting, changes in bowel habits, weight loss. Has not taken antacids. No history of H. pylori infection.

## 2023-05-01 NOTE — ASSESSMENT
[FreeTextEntry1] : 33F w/ PMH cluster headaches presenting to establish care; w/ acute complaint of worsening and more frequent HA occurring over last 3 months, similar in character to previous headaches. \par \par #Headache: begin in bitemporal region, spread to rest of her head, associated w/ phonophobia and nausea, partially relieved w/ Tylenol. Previously diagnosed with cluster headache and had occipital nerve block. \par - given neurology referral to f/u re potential block\par - restart magnesium oxide 200mg BID\par - counseled on taking up to 4g Tylenol daily - can provide more relief and patient can take more regularly PRN than she is currently\par - will keep headache journal to identify environmental/food triggers\par \par #Epigastric pain: begins in AM, associated w/ nausea and regurgitation of sour liquid, relieved by drinking hot tea, currently w/o alarm symptoms. \par - send H. Pylori; f/u results\par \par #HCM\par - pap w/ co-testing: uptodate, next due 2026\par - starting mini pill today\par - will send routine labs: CBC, CMP, A1c, lipid profile\par \par RTC 10 weeks to discuss headaches, progress with neurology. \par \par Case d/w Dr. Kinsey

## 2023-05-02 DIAGNOSIS — R10.13 EPIGASTRIC PAIN: ICD-10-CM

## 2023-05-02 DIAGNOSIS — R51.9 HEADACHE, UNSPECIFIED: ICD-10-CM

## 2023-05-02 DIAGNOSIS — Z30.9 ENCOUNTER FOR CONTRACEPTIVE MANAGEMENT, UNSPECIFIED: ICD-10-CM

## 2023-05-02 DIAGNOSIS — G44.019 EPISODIC CLUSTER HEADACHE, NOT INTRACTABLE: ICD-10-CM

## 2023-05-02 PROCEDURE — 83036 HEMOGLOBIN GLYCOSYLATED A1C: CPT

## 2023-05-02 PROCEDURE — 80053 COMPREHEN METABOLIC PANEL: CPT

## 2023-05-02 PROCEDURE — 80061 LIPID PANEL: CPT

## 2023-05-02 PROCEDURE — G0463: CPT

## 2023-05-02 PROCEDURE — 36415 COLL VENOUS BLD VENIPUNCTURE: CPT

## 2023-05-02 PROCEDURE — 85027 COMPLETE CBC AUTOMATED: CPT

## 2023-05-02 PROCEDURE — 87338 HPYLORI STOOL AG IA: CPT

## 2023-05-03 LAB
ALBUMIN SERPL ELPH-MCNC: 4.7 G/DL
ALP BLD-CCNC: 70 U/L
ALT SERPL-CCNC: 9 U/L
ANION GAP SERPL CALC-SCNC: 12 MMOL/L
AST SERPL-CCNC: 13 U/L
BILIRUB SERPL-MCNC: 0.2 MG/DL
BUN SERPL-MCNC: 11 MG/DL
CALCIUM SERPL-MCNC: 9.5 MG/DL
CHLORIDE SERPL-SCNC: 103 MMOL/L
CHOLEST SERPL-MCNC: 197 MG/DL
CO2 SERPL-SCNC: 24 MMOL/L
CREAT SERPL-MCNC: 0.57 MG/DL
EGFR: 121 ML/MIN/1.73M2
ESTIMATED AVERAGE GLUCOSE: 114 MG/DL
GLUCOSE SERPL-MCNC: 99 MG/DL
H PYLORI AG STL QL: NEGATIVE
HBA1C MFR BLD HPLC: 5.6 %
HCT VFR BLD CALC: 40.8 %
HDLC SERPL-MCNC: 39 MG/DL
HGB BLD-MCNC: 12.9 G/DL
LDLC SERPL CALC-MCNC: 131 MG/DL
MCHC RBC-ENTMCNC: 27.9 PG
MCHC RBC-ENTMCNC: 31.6 GM/DL
MCV RBC AUTO: 88.3 FL
NONHDLC SERPL-MCNC: 158 MG/DL
PLATELET # BLD AUTO: 217 K/UL
POTASSIUM SERPL-SCNC: 4.8 MMOL/L
PROT SERPL-MCNC: 7.2 G/DL
RBC # BLD: 4.62 M/UL
RBC # FLD: 13.1 %
SODIUM SERPL-SCNC: 138 MMOL/L
TRIGL SERPL-MCNC: 134 MG/DL
WBC # FLD AUTO: 4.58 K/UL

## 2023-05-09 ENCOUNTER — NON-APPOINTMENT (OUTPATIENT)
Age: 33
End: 2023-05-09

## 2023-05-25 ENCOUNTER — APPOINTMENT (OUTPATIENT)
Dept: NEUROLOGY | Facility: HOSPITAL | Age: 33
End: 2023-05-25

## 2023-05-25 ENCOUNTER — OUTPATIENT (OUTPATIENT)
Dept: OUTPATIENT SERVICES | Facility: HOSPITAL | Age: 33
LOS: 1 days | End: 2023-05-25
Payer: MEDICAID

## 2023-05-25 VITALS
RESPIRATION RATE: 16 BRPM | HEIGHT: 62.5 IN | HEART RATE: 71 BPM | WEIGHT: 149 LBS | SYSTOLIC BLOOD PRESSURE: 122 MMHG | TEMPERATURE: 96.8 F | OXYGEN SATURATION: 98 % | DIASTOLIC BLOOD PRESSURE: 76 MMHG | BODY MASS INDEX: 26.74 KG/M2

## 2023-05-25 DIAGNOSIS — R51.9 HEADACHE, UNSPECIFIED: ICD-10-CM

## 2023-05-25 DIAGNOSIS — Z98.51 TUBAL LIGATION STATUS: Chronic | ICD-10-CM

## 2023-05-25 DIAGNOSIS — R56.9 UNSPECIFIED CONVULSIONS: ICD-10-CM

## 2023-05-25 PROCEDURE — G0463: CPT

## 2023-05-25 RX ORDER — MAGNESIUM OXIDE 241.3 MG/1000MG
400 TABLET ORAL DAILY
Qty: 30 | Refills: 5 | Status: COMPLETED | COMMUNITY
Start: 2023-05-25

## 2023-05-25 RX ORDER — CHLORHEXIDINE GLUCONATE 4 %
400 (240 MG) LIQUID (ML) TOPICAL DAILY
Qty: 30 | Refills: 5 | Status: COMPLETED | COMMUNITY
Start: 2023-05-25

## 2023-05-25 NOTE — DISCUSSION/SUMMARY
[FreeTextEntry1] : 28 yo woman with PMH of cluster HAs has presented as a follow up to clinic due to recurrent HAs and new onset vertigo with positional changes. Pt states that about 3 months ago and last monday she was getting bifrontal and occipital HAs with associated phonophobia and flashing lights and nasal congestion/watery eyes and mild vertigo. States tylenol helped, but previously was taking magnesium oxide which has significantly helped her with her symptoms (but ran out of prescription). Pt states that this AM she woke up normal but then started having room spinning vertigo after she changed position from laying down to standing up - lasted for 1 minute after which it resolved. Had intense nausea for 5 minutes after about a half hour, self resolved. Did not have a HA today or her other usual HA symptoms. Pt admits to having tinnitus for the past 2 months, especially when leaning forward. Pt denies any recent numbness, tingling, focal weakness, trauma. \par .\par \par Exam: Working diagnosis of cluster HA/complex migraine. New onset intense vertigo with R ear tinnitus concerning for BPPV vs other ear pathology\par \par Plan:\par -ENT referrel\par - Magnesium Oxide 200 mg BID - can be bought over the counter\par - c/w Tylenol PRN\par -Headache daily\par -vestibular therapy\par -Meclizine for vertigo PRN\par -return to clinic in 4 months\par \par Plan discussed w/ attending

## 2023-05-25 NOTE — HISTORY OF PRESENT ILLNESS
[FreeTextEntry1] : F/U 23:  [id number]\par 30 yo woman with PMH of cluster HAs has presented as a follow up to clinic due to recurrent HAs and new onset vertigo with positional changes. Pt states that about 3 months ago and last monday she was getting bifrontal and occipital HAs with associated phonophobia and flashing lights and nasal congestion/watery eyes and mild vertigo. States tylenol helped, but previously was taking magnesium oxide which has significantly helped her with her symptoms (but ran out of prescription). Pt states that this AM she woke up normal but then started having room spinning vertigo after she changed position from laying down to standing up - lasted for 1 minute after which it resolved. Had intense nausea for 5 minutes after about a half hour, self resolved. Did not have a HA today or her other usual HA symptoms. Pt admits to having tinnitus for the past 2 months, especially when leaning forward. Pt denies any recent numbness, tingling, focal weakness, trauma. \par \par F/U 19: \par 30 yo  woman 22 weeks into gestation who presents to [facility] neurology clinic for follow up regarding working diagnosis of cluster headaches. Patient was started on Magnesium Oxide 200mg BID, continued on oral Tylenol PRN and given an in office left occipital nerve block in December 10 2019. Headaches have since decreased in frequency (8x monthly -> 2x monthly), but no change in character, associated symptoms (stress/activity aggregate, rest/ Tylenol alleviate; dizziness (nonvertiginous), double vision (monocular on the left), drooping of the left eye, nausea and phonophobia as well as left sided nasal congestion, but not photophobia). Patient's most recent episode was on the , lasted 2 days, before completely resolving. No complications with her pregnancy thus far. Patient is asymptomatic at this time. \par __________\par Pt is a 30 yo F with no PMH currently 7 weeks pregnant (3 prior births, 1 prior planned ) who is referred to us for HAs that started in 2018. HAs described pressure with tingling followed by sharp (pins/needles) left head pain with left eye pain, blurriness and redness. Frequency is 8/month. No change in character/frequency with current pregnancy. Symptoms last for 3 hours. Alleviated by Tylenol and rest, laying down. Worsened by movement. Associated with dizziness (nonvertiginous), double vision (monocular on the left), drooping of the left eye, nausea and phonophobia as well as left sided nasal congestion, but not photophobia. Denies any fevers, chills, numbness, tingling, weakness, changes in speech. No family hx of headaches or personal hx of headaches earlier in life.\par \par  ID: [id number]

## 2023-05-25 NOTE — PHYSICAL EXAM
[General Appearance - Alert] : alert [General Appearance - In No Acute Distress] : in no acute distress [General Appearance - Well Nourished] : well nourished [General Appearance - Well Developed] : well developed [General Appearance - Well-Appearing] : healthy appearing [Oriented To Time, Place, And Person] : oriented to person, place, and time [Affect] : the affect was normal [Mood] : the mood was normal [Memory Recent] : recent memory was not impaired [Memory Remote] : remote memory was not impaired [Cranial Nerves Optic (II)] : visual acuity intact bilaterally,  visual fields full to confrontation, pupils equal round and reactive to light [Cranial Nerves Oculomotor (III)] : extraocular motion intact [Cranial Nerves Trigeminal (V)] : facial sensation intact symmetrically [Cranial Nerves Facial (VII)] : face symmetrical [Cranial Nerves Vestibulocochlear (VIII)] : hearing was intact bilaterally [Cranial Nerves Glossopharyngeal (IX)] : tongue and palate midline [Cranial Nerves Accessory (XI - Cranial And Spinal)] : head turning and shoulder shrug symmetric [Cranial Nerves Hypoglossal (XII)] : there was no tongue deviation with protrusion [Motor Tone] : muscle tone was normal in all four extremities [Motor Strength] : muscle strength was normal in all four extremities [Involuntary Movements] : no involuntary movements were seen [No Muscle Atrophy] : normal bulk in all four extremities [Motor Handedness Right-Handed] : the patient is right hand dominant [Sensation Tactile Decrease] : light touch was intact [Proprioception] : proprioception was intact [Abnormal Walk] : normal gait [Balance] : balance was intact [Sclera] : the sclera and conjunctiva were normal [PERRL With Normal Accommodation] : pupils were equal in size, round, reactive to light, with normal accommodation [Extraocular Movements] : extraocular movements were intact [Optic Disc Abnormality] : the optic disc were normal in size and color [No APD] : no afferent pupillary defect [No TONY] : no internuclear ophthalmoplegia [Full Visual Field] : full visual field [Outer Ear] : the ears and nose were normal in appearance [Hearing Threshold Finger Rub Not [place]] : hearing was normal [Examination Of The Oral Cavity] : the lips and gums were normal [Both Tympanic Membranes Were Examined] : both tympanic membranes were normal [Oropharynx] : the oropharynx was normal [Neck Appearance] : the appearance of the neck was normal [Neck Cervical Mass (___cm)] : no neck mass was observed [] : no respiratory distress [Exaggerated Use Of Accessory Muscles For Inspiration] : no accessory muscle use [Person] : oriented to person [Place] : oriented to place [Time] : oriented to time [2+] : Ankle jerk left 2+ [FreeTextEntry1] : As above.  [Paresis Pronator Drift Right-Sided] : no pronator drift on the right [Paresis Pronator Drift Left-Sided] : no pronator drift on the left [Motor Strength Upper Extremities Bilaterally] : strength was normal in both upper extremities [Motor Strength Lower Extremities Bilaterally] : strength was normal in both lower extremities [Limited Balance] : balance was intact [Past-pointing] : there was no past-pointing [Tremor] : no tremor present [Dysdiadochokinesia Bilaterally] : not present [Coordination - Dysmetria Impaired Finger-to-Nose Bilateral] : not present [Coordination - Dysmetria Impaired Heel-to-Shin Bilateral] : not present [Plantar Reflex Right Only] : normal on the right [Plantar Reflex Left Only] : normal on the left [___] : absent on the right [___] : absent on the left

## 2023-05-25 NOTE — REVIEW OF SYSTEMS
[As Noted in HPI] : as noted in HPI [Dizziness] : dizziness [Vertigo] : vertigo [Cluster Headache] : cluster headaches [Seizures] : no convulsions

## 2023-06-30 ENCOUNTER — APPOINTMENT (OUTPATIENT)
Dept: OTOLARYNGOLOGY | Facility: CLINIC | Age: 33
End: 2023-06-30
Payer: MEDICAID

## 2023-06-30 VITALS
BODY MASS INDEX: 26.74 KG/M2 | DIASTOLIC BLOOD PRESSURE: 77 MMHG | OXYGEN SATURATION: 98 % | SYSTOLIC BLOOD PRESSURE: 122 MMHG | HEART RATE: 70 BPM | HEIGHT: 62.5 IN | WEIGHT: 149 LBS

## 2023-06-30 DIAGNOSIS — R26.89 OTHER ABNORMALITIES OF GAIT AND MOBILITY: ICD-10-CM

## 2023-06-30 PROCEDURE — 99204 OFFICE O/P NEW MOD 45 MIN: CPT

## 2023-06-30 PROCEDURE — 92557 COMPREHENSIVE HEARING TEST: CPT

## 2023-06-30 PROCEDURE — 92625 TINNITUS ASSESSMENT: CPT

## 2023-06-30 PROCEDURE — 92568 ACOUSTIC REFL THRESHOLD TST: CPT

## 2023-07-06 RX ORDER — SOD CHLOR,BICARB/SQUEEZ BOTTLE
PACKET, WITH RINSE DEVICE NASAL
Qty: 1 | Refills: 3 | Status: ACTIVE | COMMUNITY
Start: 2023-07-06 | End: 1900-01-01

## 2023-07-06 NOTE — DATA REVIEWED
[de-identified] : An audiogram was ordered and performed including pure tones, tympanometry and speech testing for the patients complaint of hearing loss\par I have independently reviewed the patient's audiogram from today and my findings include \par AD Mild -500 hz rising to normal hearing through 8k hz. AS Hearing -8k hz. AU Tymp A. Tinnitus Match 20dB @ 8k hz. Reflux present bilaterally

## 2023-07-06 NOTE — HISTORY OF PRESENT ILLNESS
[de-identified] : 33 year old female referred by Dr. Molly Lindsey, PCP for buzzing sound in ear.\par States buzzing in ear has been for 3 months R>L. \par Reports bilateral clogged ears.\par States will have headaches when she hears the "buzzing".  Buzzing also associated with room spinning\par Denies head/otologic trauma. Denies new medication. \par Family history of hearing loss (grandfather- 55 years old- denies hearing aid). Never had audiogram before . \par Denies prior CT scan or MRI. \par States drinks 1 cup of coffee per day, rarely alcohol consumption.\par Denies past loud noise exposure. \par Denies ear noises preventing from falling asleep at night. \par States generally sleep well.

## 2023-07-06 NOTE — REASON FOR VISIT
[Initial Consultation] : an initial consultation for [Other: ______] : provided by PARKER [FreeTextEntry2] : referred by Dr. Molly Lindsey, PCP for dizziness and buzzing sound in ear. [Interpreters_IDNumber] : [id number] [Interpreters_FullName] : Ford [TWNoteComboBox1] : Mauritanian

## 2023-07-06 NOTE — ASSESSMENT
[FreeTextEntry1] : 33Y F presents with right tinnitus 2/2 right low frequency conductive hearing loss and dizziness\par \par No patterns of low frequency or asymmetrical SNHL on audiogram. [place] Meraz-pike negative. Fukuda Step Test Unable to complete due to instability. Positive Romberg. \par \par Patient denies acute history of recent viral infection \par \par Long history of headaches / migraines. \par \par Personally reviewed audiogram shows AD Mild -500 hz rising to normal hearing through 8k hz. AS Hearing -8k hz. AU Tymp A. Tinnitus Match 20dB @ 8k hz. Reflux present bilaterally\par \par Based on clinical history and physical exam suspicious for vestibular migraines\par \par Recommend\par Dizziness\par -Ordered VNG/ENG with water caloric test  to further evaluate the inner ear\par -Discussed with patient to not to take any anti-dizzy medication before VNG/ENG testing\par -Continue Ondansetron for Nausea\par - on keeping meclizine usage to as needed\par -Cawthrone's Head Exercise Handout Given\par -Instructed Patient to follow up with Primary Care to make sure routine blood work is completed for medical etiologies for dizziness like anemia/orthostatic hypotension.\par -Instructed patient should follow up with cardiology to rule out cardiac etiology and neurology for central etiology of dizziness\par -Continue with Vestibular Therapy to help focus on balance therapy to help prevent falls\par \par Vestibular Migraine\par - Discuss with patient that Migraines do not always have to take the form of a headache. Sometimes Migraines change over time to presents with Auras that affect the way we perceptive balance.\par - These symptoms are not generally associated with pressure or hearing changes but originate in the brainstem from faulty central processing of balance information from the inner ears. \par - Migraine Handout Given\par - Explained that treatment for Vestibular Migraines is to control the migraine itself with Mg supplements, Lifestyle changes, Improve sleep habits, and target food triggers that may exacerbate migraines\par -Cawthrone's Head Exercise Handout Given\par -Patient has used Magnesium in the past with some improvement. Stopped because she thought it was prescribed. Instructed can get over the counter\par -Patient following neurology already.\par \par Right Conductive Hearing Loss\par -Discussed a conductive hearing loss happens when sounds cannot get through the outer and middle ear. It may be hard to hear soft sounds. Louder sounds may be muffled.\par -Most common reasons for CHL are fluid in your middle ear from colds or allergies, ear infection, otitis media, TM Perforation, Wax\par -None of which patient currently has\par -Trial of sinus rinse + flonase\par -MRI Brain / IAC for further evaluation of middle and inner ear.\par \par Return to clinic in 2 months or sooner if new/worsen symptoms present\par \par

## 2023-07-06 NOTE — PHYSICAL EXAM
[Nystagmus] : ~T ~M nystagmus was seen [Fukuda Step Test] : Fukuda Step Test was Positive [Romberg's Sign] : Romberg's sign was present [Normal] : mucosa is normal [Midline] : trachea located in midline position [Petra-Halllorike] : Camilla-Hallpike: Negative

## 2023-07-10 ENCOUNTER — APPOINTMENT (OUTPATIENT)
Dept: INTERNAL MEDICINE | Facility: CLINIC | Age: 33
End: 2023-07-10
Payer: SELF-PAY

## 2023-07-10 ENCOUNTER — OUTPATIENT (OUTPATIENT)
Dept: OUTPATIENT SERVICES | Facility: HOSPITAL | Age: 33
LOS: 1 days | End: 2023-07-10
Payer: SELF-PAY

## 2023-07-10 VITALS
DIASTOLIC BLOOD PRESSURE: 60 MMHG | HEIGHT: 62.5 IN | HEART RATE: 60 BPM | WEIGHT: 143 LBS | SYSTOLIC BLOOD PRESSURE: 100 MMHG | OXYGEN SATURATION: 99 % | BODY MASS INDEX: 25.66 KG/M2

## 2023-07-10 DIAGNOSIS — Z98.51 TUBAL LIGATION STATUS: Chronic | ICD-10-CM

## 2023-07-10 DIAGNOSIS — Z00.00 ENCOUNTER FOR GENERAL ADULT MEDICAL EXAMINATION W/OUT ABNORMAL FINDINGS: ICD-10-CM

## 2023-07-10 DIAGNOSIS — I10 ESSENTIAL (PRIMARY) HYPERTENSION: ICD-10-CM

## 2023-07-10 PROCEDURE — T1013: CPT

## 2023-07-10 PROCEDURE — ZZZZZ: CPT | Mod: GC

## 2023-07-10 PROCEDURE — G0463: CPT

## 2023-07-10 NOTE — ASSESSMENT
[FreeTextEntry1] : 33F w/ PMH cluster headaches presenting to establish care; w/ acute complaint of worsening and more frequent HA occurring over last 3 months, w/o relief from magnesium, meclizine, vestibular therapy.\par \par - stop magnesium and meclizine\par - enrolled in sliding scale today --> will try again to schedule MRI now\par - agree w/ neuro and kerri follow-up\par - start sumatriptan 50mg up to BID for abortive therapy for migraine; advised to take it max twice daily, confirmed patient is not breastfeeding or attempting to conceive\par - labs - CBC, CMP done in May 2023; normal\par \par RTC 10 weeks to review MRI, progress w/ sumatriptan. \par \par Case d/w Dr. Kinsey.

## 2023-07-10 NOTE — PHYSICAL EXAM
[No Acute Distress] : no acute distress [Well-Appearing] : well-appearing [Normal Sclera/Conjunctiva] : normal sclera/conjunctiva [PERRL] : pupils equal round and reactive to light [Normal Outer Ear/Nose] : the outer ears and nose were normal in appearance [No Edema] : there was no peripheral edema [Normal] : soft, non-tender, non-distended, no masses palpated, no HSM and normal bowel sounds [No Spinal Tenderness] : no spinal tenderness [Coordination Grossly Intact] : coordination grossly intact [No Focal Deficits] : no focal deficits [Normal Gait] : normal gait [Normal Affect] : the affect was normal [de-identified] : pain w/ eye movements in medial-lateral pain

## 2023-07-10 NOTE — REVIEW OF SYSTEMS
[Fatigue] : fatigue [Recent Change In Weight] : ~T recent weight change [Vision Problems] : vision problems [Headache] : headache [Dizziness] : dizziness [Unsteady Walking] : ataxia [Negative] : Musculoskeletal [Fever] : no fever [Hearing Loss] : no hearing loss [Confusion] : no confusion [Insomnia] : no insomnia [Depression] : no depression [FreeTextEntry2] : 6lb intentional weight loss

## 2023-07-10 NOTE — END OF VISIT
[] : Resident [FreeTextEntry3] : fu of headaches, not improved w meclizine, tylenol, motrin, mg\par pt enrolled in sliding scale for mri\par cont neuro/ent fu\par rec triptan trial given cluster vs migraines\par fu 5w. \par no red flag sx today. neuro exam normal. fu ophtho\par Call if any new or worsening symptoms.

## 2023-07-10 NOTE — HISTORY OF PRESENT ILLNESS
[FreeTextEntry1] : 33F presenting for follow-up of headaches.  [de-identified] : Since last visit, patient saw neuro --> referred to ENT, suspected BPPV vs migraine --> referred to otology, recommended to continue meclizine PRN, schedule brain MRI, continue magnesium. \par Found to have low-frequency conductive hearing loss and dizziness, kerri concerned for vestibular migraines. Ordered VNG/ENG testing.\par Has not had MRI as insurance  - had medicaid due to pregnancy. \par Feels mag and meclizine are not helping, headache increases in frequency.  Has tried tylenol, w/ minimal relief. Feels only respite is cold compresses on posterior neck. \par Endorses increased difficulty w/ driving at night due to hard time discerning details. Otherwise denies vision changes, eye pain.

## 2023-07-10 NOTE — INTERPRETER SERVICES
[[employer] ] : provided by [employer]   [Time Spent: ____ minutes] : Total time spent using  services: [unfilled] minutes. The patient's primary language is not English thus required  services. [Interpreters_IDNumber] : [id number] [Interpreters_FullName] : Eleazar [TWNoteComboBox1] : Algerian

## 2023-07-11 DIAGNOSIS — R51.9 HEADACHE, UNSPECIFIED: ICD-10-CM

## 2023-07-11 DIAGNOSIS — R42 DIZZINESS AND GIDDINESS: ICD-10-CM

## 2023-07-16 ENCOUNTER — OUTPATIENT (OUTPATIENT)
Dept: OUTPATIENT SERVICES | Facility: HOSPITAL | Age: 33
LOS: 1 days | End: 2023-07-16
Payer: SELF-PAY

## 2023-07-16 ENCOUNTER — APPOINTMENT (OUTPATIENT)
Dept: MRI IMAGING | Facility: CLINIC | Age: 33
End: 2023-07-16
Payer: COMMERCIAL

## 2023-07-16 DIAGNOSIS — Z00.8 ENCOUNTER FOR OTHER GENERAL EXAMINATION: ICD-10-CM

## 2023-07-16 DIAGNOSIS — Z98.51 TUBAL LIGATION STATUS: Chronic | ICD-10-CM

## 2023-07-16 PROCEDURE — A9585: CPT

## 2023-07-16 PROCEDURE — 70553 MRI BRAIN STEM W/O & W/DYE: CPT | Mod: 26

## 2023-07-16 PROCEDURE — 70553 MRI BRAIN STEM W/O & W/DYE: CPT

## 2023-07-17 ENCOUNTER — NON-APPOINTMENT (OUTPATIENT)
Age: 33
End: 2023-07-17

## 2023-09-20 ENCOUNTER — APPOINTMENT (OUTPATIENT)
Dept: INTERNAL MEDICINE | Facility: CLINIC | Age: 33
End: 2023-09-20
Payer: MEDICAID

## 2023-09-20 ENCOUNTER — OUTPATIENT (OUTPATIENT)
Dept: OUTPATIENT SERVICES | Facility: HOSPITAL | Age: 33
LOS: 1 days | End: 2023-09-20
Payer: SELF-PAY

## 2023-09-20 VITALS
OXYGEN SATURATION: 98 % | WEIGHT: 140 LBS | BODY MASS INDEX: 25.12 KG/M2 | HEART RATE: 71 BPM | SYSTOLIC BLOOD PRESSURE: 120 MMHG | HEIGHT: 62.5 IN | DIASTOLIC BLOOD PRESSURE: 80 MMHG

## 2023-09-20 DIAGNOSIS — Z98.51 TUBAL LIGATION STATUS: Chronic | ICD-10-CM

## 2023-09-20 DIAGNOSIS — R51.9 HEADACHE, UNSPECIFIED: ICD-10-CM

## 2023-09-20 DIAGNOSIS — I10 ESSENTIAL (PRIMARY) HYPERTENSION: ICD-10-CM

## 2023-09-20 DIAGNOSIS — G44.019 EPISODIC CLUSTER HEADACHE, NOT INTRACTABLE: ICD-10-CM

## 2023-09-20 DIAGNOSIS — N89.8 OTHER SPECIFIED NONINFLAMMATORY DISORDERS OF VAGINA: ICD-10-CM

## 2023-09-20 DIAGNOSIS — R10.2 PELVIC AND PERINEAL PAIN: ICD-10-CM

## 2023-09-20 PROCEDURE — G0463: CPT

## 2023-09-20 PROCEDURE — 99213 OFFICE O/P EST LOW 20 MIN: CPT

## 2023-09-22 PROBLEM — G44.019 EPISODIC CLUSTER HEADACHE, NOT INTRACTABLE: Status: ACTIVE | Noted: 2019-12-10

## 2023-09-22 PROBLEM — R51.9 HEADACHE: Status: ACTIVE | Noted: 2019-11-19

## 2023-09-22 PROBLEM — N89.8 VAGINAL ITCHING: Status: ACTIVE | Noted: 2021-07-01

## 2023-09-22 PROBLEM — R10.2 PAIN PELVIC: Status: ACTIVE | Noted: 2023-04-11

## 2023-09-22 LAB
APPEARANCE: CLEAR
BILIRUBIN URINE: NEGATIVE
BLOOD URINE: NEGATIVE
COLOR: YELLOW
GLUCOSE QUALITATIVE U: NEGATIVE MG/DL
KETONES URINE: NEGATIVE MG/DL
LEUKOCYTE ESTERASE URINE: NEGATIVE
NITRITE URINE: NEGATIVE
PH URINE: 7.5
PROTEIN URINE: NEGATIVE MG/DL
SPECIFIC GRAVITY URINE: 1.02
UROBILINOGEN URINE: 0.2 MG/DL

## 2023-09-25 DIAGNOSIS — R10.2 PELVIC AND PERINEAL PAIN: ICD-10-CM

## 2023-09-25 DIAGNOSIS — G43.909 MIGRAINE, UNSPECIFIED, NOT INTRACTABLE, WITHOUT STATUS MIGRAINOSUS: ICD-10-CM

## 2023-09-25 DIAGNOSIS — R51.9 HEADACHE, UNSPECIFIED: ICD-10-CM

## 2023-09-25 DIAGNOSIS — N89.8 OTHER SPECIFIED NONINFLAMMATORY DISORDERS OF VAGINA: ICD-10-CM

## 2023-09-25 DIAGNOSIS — G44.019 EPISODIC CLUSTER HEADACHE, NOT INTRACTABLE: ICD-10-CM

## 2023-09-26 ENCOUNTER — APPOINTMENT (OUTPATIENT)
Dept: OTOLARYNGOLOGY | Facility: CLINIC | Age: 33
End: 2023-09-26
Payer: COMMERCIAL

## 2023-09-26 PROCEDURE — 92547 SUPPLEMENTAL ELECTRICAL TEST: CPT

## 2023-09-26 PROCEDURE — 92567 TYMPANOMETRY: CPT

## 2023-09-26 PROCEDURE — 92537 CALORIC VSTBLR TEST W/REC: CPT

## 2023-09-26 PROCEDURE — 92540 BASIC VESTIBULAR EVALUATION: CPT

## 2023-09-27 RX ORDER — SUMATRIPTAN 50 MG/1
50 TABLET, FILM COATED ORAL
Qty: 60 | Refills: 1 | Status: DISCONTINUED | COMMUNITY
Start: 2023-07-10 | End: 2023-09-27

## 2023-09-28 RX ORDER — SUMATRIPTAN 100 MG/1
100 TABLET, FILM COATED ORAL
Qty: 9 | Refills: 0 | Status: ACTIVE | COMMUNITY
Start: 2023-09-20 | End: 1900-01-01

## 2023-10-04 ENCOUNTER — APPOINTMENT (OUTPATIENT)
Dept: OTOLARYNGOLOGY | Facility: CLINIC | Age: 33
End: 2023-10-04
Payer: COMMERCIAL

## 2023-10-04 VITALS
WEIGHT: 140 LBS | BODY MASS INDEX: 25.12 KG/M2 | HEIGHT: 62.5 IN | DIASTOLIC BLOOD PRESSURE: 80 MMHG | SYSTOLIC BLOOD PRESSURE: 125 MMHG

## 2023-10-04 PROCEDURE — 92567 TYMPANOMETRY: CPT

## 2023-10-04 PROCEDURE — 92557 COMPREHENSIVE HEARING TEST: CPT

## 2023-10-04 PROCEDURE — 99214 OFFICE O/P EST MOD 30 MIN: CPT | Mod: 25

## 2023-10-05 ENCOUNTER — RESULT REVIEW (OUTPATIENT)
Age: 33
End: 2023-10-05

## 2023-10-06 ENCOUNTER — OUTPATIENT (OUTPATIENT)
Dept: OUTPATIENT SERVICES | Facility: HOSPITAL | Age: 33
LOS: 1 days | End: 2023-10-06
Payer: SELF-PAY

## 2023-10-06 ENCOUNTER — APPOINTMENT (OUTPATIENT)
Dept: CT IMAGING | Facility: CLINIC | Age: 33
End: 2023-10-06
Payer: MEDICAID

## 2023-10-06 DIAGNOSIS — Z98.51 TUBAL LIGATION STATUS: Chronic | ICD-10-CM

## 2023-10-06 DIAGNOSIS — H90.11 CONDUCTIVE HEARING LOSS, UNILATERAL, RIGHT EAR, WITH UNRESTRICTED HEARING ON THE CONTRALATERAL SIDE: ICD-10-CM

## 2023-10-06 PROCEDURE — 70480 CT ORBIT/EAR/FOSSA W/O DYE: CPT

## 2023-10-06 PROCEDURE — 70480 CT ORBIT/EAR/FOSSA W/O DYE: CPT | Mod: 26

## 2023-10-12 ENCOUNTER — OUTPATIENT (OUTPATIENT)
Dept: OUTPATIENT SERVICES | Facility: HOSPITAL | Age: 33
LOS: 1 days | End: 2023-10-12
Payer: SELF-PAY

## 2023-10-12 ENCOUNTER — APPOINTMENT (OUTPATIENT)
Dept: NEUROLOGY | Facility: HOSPITAL | Age: 33
End: 2023-10-12

## 2023-10-12 VITALS
HEART RATE: 66 BPM | SYSTOLIC BLOOD PRESSURE: 131 MMHG | WEIGHT: 141 LBS | BODY MASS INDEX: 25.38 KG/M2 | TEMPERATURE: 96.7 F | DIASTOLIC BLOOD PRESSURE: 87 MMHG

## 2023-10-12 DIAGNOSIS — Z98.51 TUBAL LIGATION STATUS: Chronic | ICD-10-CM

## 2023-10-12 DIAGNOSIS — G43.909 MIGRAINE, UNSPECIFIED, NOT INTRACTABLE, W/OUT STATUS MIGRAINOSUS: ICD-10-CM

## 2023-10-12 DIAGNOSIS — R42 DIZZINESS AND GIDDINESS: ICD-10-CM

## 2023-10-12 DIAGNOSIS — G43.909 MIGRAINE, UNSPECIFIED, NOT INTRACTABLE, WITHOUT STATUS MIGRAINOSUS: ICD-10-CM

## 2023-10-12 DIAGNOSIS — R51.9 HEADACHE, UNSPECIFIED: ICD-10-CM

## 2023-10-12 PROCEDURE — G0463: CPT

## 2023-10-12 RX ORDER — FLUTICASONE PROPIONATE 50 UG/1
50 SPRAY, METERED NASAL DAILY
Qty: 1 | Refills: 3 | Status: DISCONTINUED | COMMUNITY
Start: 2023-07-06 | End: 2023-10-12

## 2023-10-12 RX ORDER — MAGNESIUM OXIDE 241.3 MG/1000MG
400 TABLET ORAL DAILY
Qty: 30 | Refills: 0 | Status: DISCONTINUED | COMMUNITY
Start: 2023-05-25 | End: 2023-10-12

## 2023-10-12 RX ORDER — MECLIZINE HYDROCHLORIDE 25 MG/1
25 TABLET ORAL 3 TIMES DAILY
Qty: 90 | Refills: 0 | Status: DISCONTINUED | COMMUNITY
Start: 2023-05-25 | End: 2023-10-12

## 2023-11-03 ENCOUNTER — OUTPATIENT (OUTPATIENT)
Dept: OUTPATIENT SERVICES | Facility: HOSPITAL | Age: 33
LOS: 1 days | End: 2023-11-03
Payer: SELF-PAY

## 2023-11-03 ENCOUNTER — APPOINTMENT (OUTPATIENT)
Dept: OBGYN | Facility: CLINIC | Age: 33
End: 2023-11-03
Payer: COMMERCIAL

## 2023-11-03 ENCOUNTER — MED ADMIN CHARGE (OUTPATIENT)
Age: 33
End: 2023-11-03

## 2023-11-03 DIAGNOSIS — Z98.51 TUBAL LIGATION STATUS: Chronic | ICD-10-CM

## 2023-11-03 DIAGNOSIS — N76.0 ACUTE VAGINITIS: ICD-10-CM

## 2023-11-03 LAB
HCG UR QL: NEGATIVE
QUALITY CONTROL: YES

## 2023-11-03 PROCEDURE — 90471 IMMUNIZATION ADMIN: CPT | Mod: NC

## 2023-11-03 PROCEDURE — 81025 URINE PREGNANCY TEST: CPT

## 2023-11-03 PROCEDURE — 90651 9VHPV VACCINE 2/3 DOSE IM: CPT

## 2023-11-03 PROCEDURE — 90471 IMMUNIZATION ADMIN: CPT

## 2023-11-17 DIAGNOSIS — Z23 ENCOUNTER FOR IMMUNIZATION: ICD-10-CM

## 2023-12-19 ENCOUNTER — APPOINTMENT (OUTPATIENT)
Dept: OTOLARYNGOLOGY | Facility: CLINIC | Age: 33
End: 2023-12-19
Payer: SELF-PAY

## 2023-12-19 PROCEDURE — 92517 VEMP TEST I&R CERVICAL: CPT

## 2023-12-22 ENCOUNTER — APPOINTMENT (OUTPATIENT)
Dept: OTOLARYNGOLOGY | Facility: CLINIC | Age: 33
End: 2023-12-22
Payer: COMMERCIAL

## 2023-12-22 VITALS
BODY MASS INDEX: 25.3 KG/M2 | DIASTOLIC BLOOD PRESSURE: 78 MMHG | SYSTOLIC BLOOD PRESSURE: 128 MMHG | HEART RATE: 71 BPM | WEIGHT: 141 LBS | HEIGHT: 62.5 IN

## 2023-12-22 PROCEDURE — 99214 OFFICE O/P EST MOD 30 MIN: CPT

## 2023-12-23 NOTE — ASSESSMENT
[FreeTextEntry1] : Reports stable right ear symptoms and disequilibrium.  Exam today again shows intact bilateral tympanic membranes without effusion or retraction.  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  I personally reviewed and interpreted recent cVEMP results which shows reduced thresholds bilaterally.  Counseled patient that I believe her right low-frequency conductive loss and other vestibular symptoms are secondary to superior canal dehiscence.  Discussed pathophysiology of this condition as well as treatment options, including observation or surgical plugging.  Also counseled patient that some percent patients may exhibit improvement in pressure induced symptoms with placement of a ventilating tube.  Patient plans to pursue observation, for now follow-up 4 to 6 months, if pressure induced symptoms worsen consider placement of PE tube.  I spent a total of 30 minutes on the date of the encounter evaluating and treating the patient.

## 2024-03-04 ENCOUNTER — APPOINTMENT (OUTPATIENT)
Dept: OBGYN | Facility: CLINIC | Age: 34
End: 2024-03-04

## 2024-03-04 ENCOUNTER — OUTPATIENT (OUTPATIENT)
Dept: OUTPATIENT SERVICES | Facility: HOSPITAL | Age: 34
LOS: 1 days | End: 2024-03-04
Payer: SELF-PAY

## 2024-03-04 DIAGNOSIS — Z23 ENCOUNTER FOR IMMUNIZATION: ICD-10-CM

## 2024-03-04 DIAGNOSIS — Z98.51 TUBAL LIGATION STATUS: Chronic | ICD-10-CM

## 2024-03-04 DIAGNOSIS — N76.0 ACUTE VAGINITIS: ICD-10-CM

## 2024-03-04 LAB
HCG UR QL: NEGATIVE
QUALITY CONTROL: YES

## 2024-03-04 PROCEDURE — 90651 9VHPV VACCINE 2/3 DOSE IM: CPT

## 2024-03-04 PROCEDURE — 81025 URINE PREGNANCY TEST: CPT

## 2024-03-04 PROCEDURE — 90471 IMMUNIZATION ADMIN: CPT

## 2024-03-06 DIAGNOSIS — Z23 ENCOUNTER FOR IMMUNIZATION: ICD-10-CM

## 2024-04-30 ENCOUNTER — APPOINTMENT (OUTPATIENT)
Dept: OTOLARYNGOLOGY | Facility: CLINIC | Age: 34
End: 2024-04-30
Payer: COMMERCIAL

## 2024-04-30 VITALS
HEIGHT: 63 IN | TEMPERATURE: 98 F | WEIGHT: 141 LBS | SYSTOLIC BLOOD PRESSURE: 130 MMHG | HEART RATE: 74 BPM | BODY MASS INDEX: 24.98 KG/M2 | DIASTOLIC BLOOD PRESSURE: 84 MMHG

## 2024-04-30 DIAGNOSIS — H90.11 CONDUCTIVE HEARING LOSS, UNILATERAL, RIGHT EAR, WITH UNRESTRICTED HEARING ON THE CONTRALATERAL SIDE: ICD-10-CM

## 2024-04-30 DIAGNOSIS — H93.292 OTHER ABNORMAL AUDITORY PERCEPTIONS, LEFT EAR: ICD-10-CM

## 2024-04-30 PROCEDURE — 92557 COMPREHENSIVE HEARING TEST: CPT

## 2024-04-30 PROCEDURE — 92567 TYMPANOMETRY: CPT

## 2024-04-30 PROCEDURE — 69420 INCISION OF EARDRUM: CPT

## 2024-04-30 PROCEDURE — 99214 OFFICE O/P EST MOD 30 MIN: CPT | Mod: 25

## 2024-04-30 RX ORDER — CIPROFLOXACIN 3 MG/ML
0.3 SOLUTION OPHTHALMIC
Qty: 1 | Refills: 1 | Status: ACTIVE | COMMUNITY
Start: 2024-04-30 | End: 1900-01-01

## 2024-04-30 NOTE — HISTORY OF PRESENT ILLNESS
[de-identified] : 34 year old female presents for follow up of hearing loss in right ear with canal dehiscence Currently has pulsatile tinnitus in right ear Only has dizziness when she stands up too quickly Denies otalgia, otorrhea, ear infections, vertigo, headaches related to hearing VEMP 12/19/23 CT IAC 10/2023

## 2024-04-30 NOTE — ASSESSMENT
[FreeTextEntry1] : Otoscopic exam again shows intact normal-appearing tympanic membrane's and no effusion or retraction.  I reviewed an audiogram today which shows a mostly stable right low-frequency conductive loss rising to normal hearing, normal hearing left ear, type A tympanograms.  Patient reports that symptoms of disequilibrium worsen with straining, as does pulsatile tinnitus.  Given some of her symptoms appear pressure induced, we did discuss the option of myringotomy +/- tube insertion.  To minimize the risk of residual perforation if this does not improve her symptoms, I did offer her right myringotomy today, she agreed to proceed.  This was performed without complication.  Rx Floxin drops right ear 3 days.  Follow-up 2 to 3 weeks for reassessment of symptoms, consider tube placement if symptomatically improved.

## 2024-04-30 NOTE — PROCEDURE
[FreeTextEntry3] : Procedure note: Right myringotomy  Description of Operative Procedure:  Risks, benefits, and alternatives of the planned procedure were discussed with the patient prior to proceeding.  Risks would include but are not limited to bleeding, infection, persistent drainage, persistent perforation, or failure to improve hearing.  Benefits would include improvement in hearing.  Topical anesthetic was used to anesthetize the tympanic membrane.  A myringotomy was made. The patient tolerated the procedure without complications.

## 2024-05-21 ENCOUNTER — APPOINTMENT (OUTPATIENT)
Dept: OTOLARYNGOLOGY | Facility: CLINIC | Age: 34
End: 2024-05-21
Payer: COMMERCIAL

## 2024-05-21 DIAGNOSIS — R42 DIZZINESS AND GIDDINESS: ICD-10-CM

## 2024-05-21 DIAGNOSIS — H90.2 CONDUCTIVE HEARING LOSS, UNSPECIFIED: ICD-10-CM

## 2024-05-21 DIAGNOSIS — H83.8X1 OTHER SPECIFIED DISEASES OF RIGHT INNER EAR: ICD-10-CM

## 2024-05-21 PROCEDURE — 69433 CREATE EARDRUM OPENING: CPT

## 2024-05-21 PROCEDURE — 99214 OFFICE O/P EST MOD 30 MIN: CPT | Mod: 25

## 2024-05-21 RX ORDER — OFLOXACIN OTIC 3 MG/ML
0.3 SOLUTION AURICULAR (OTIC)
Qty: 1 | Refills: 0 | Status: ACTIVE | COMMUNITY
Start: 2024-04-30 | End: 1900-01-01

## 2024-05-21 NOTE — REASON FOR VISIT
[Subsequent Evaluation] : a subsequent evaluation for [Hearing Loss] : hearing loss [Other: ______] : provided by PARKER [Interpreters_IDNumber] : [id number] [Interpreters_FullName] : Ioana [TWNoteComboBox1] : [place]

## 2024-05-21 NOTE — PROCEDURE
[FreeTextEntry3] : Procedure note: Right myringotomy and tube insertion  Description of Operative Procedure:  Risks, benefits, and alternatives of the planned procedure were discussed with the patient prior to proceeding.  Risks would include but are not limited to bleeding, infection, persistent drainage, persistent perforation, or failure to improve hearing.  Benefits would include improvement in hearing.  Topical anesthetic was used to anesthetize the tympanic membrane.  A myringotomy was made.  Serous fluid was suctioned.  A Paparella type I ear tube was placed followed by antibiotic drops.  The patient tolerated the procedure without complications.

## 2024-05-21 NOTE — HISTORY OF PRESENT ILLNESS
[de-identified] : 35 y/o F presents for follow up reports right ear itchiness; uses drops that do not help  denies any dizzy/vertigo episodes since last visit hearing and tinnitus remains unchanged denies any new issues with pain or drainage

## 2024-05-21 NOTE — ASSESSMENT
[FreeTextEntry1] : Patient presents for follow-up for right superior canal dehiscence and pressure induced dizziness.  Reported improvement in vestibular symptoms and fullness sensation following myringotomy after last visit.  Because of improvement in symptoms, I offered that we place a longer acting right tympanostomy tube.  Exam today shows intact right tympanic membrane without effusion or retraction.  Bilateral facial nerve function normal.  With patient's consent, right ear tympanostomy tube placed without complication.  Rx Floxin drops for 3 days.  Follow-up 6 months, maintain dry ear precautions right ear, follow-up sooner with any change in ear or vestibular complaints.

## 2024-10-04 NOTE — DIETITIAN INITIAL EVALUATION ADULT. - NUTRITION DIAGNOSTIC #1
Department of Anesthesiology  Preprocedure Note       Name:  Venita Calvillo   Age:  69 y.o.  :  1955                                          MRN:  [id number]         Date:  10/4/2024      Surgeon: Surgeon(s):  Marc Lainez MD    Procedure: Procedure(s):  EXCISION SACRAL PRESSURE SORE AD FLAP CLOSURE    Medications prior to admission:   Prior to Admission medications    Medication Sig Start Date End Date Taking? Authorizing Provider   NIFEdipine (PROCARDIA XL) 30 MG extended release tablet Take 1 tablet by mouth daily 9/4/24 10/4/24 Yes Alexander Galicia MD   aspirin 81 MG EC tablet Take 1 tablet by mouth daily   Yes Alexander Galicia MD   losartan (COZAAR) 100 MG tablet Take 1 tablet by mouth daily   Yes Alexander Galicia MD   latanoprost (XALATAN) 0.005 % ophthalmic solution Place 1 drop into both eyes nightly   Yes Alexander Galicia MD   FARXIGA 10 MG tablet TAKE 1 TABLET BY MOUTH DAILY 22  Yes Alexander Galicia MD   omeprazole (PRILOSEC) 40 MG delayed release capsule Take 1 capsule by mouth daily 22  Yes Alexander Galicia MD   albuterol (PROVENTIL) (2.5 MG/3ML) 0.083% nebulizer solution Inhale 3 mLs into the lungs 4 times daily   Yes Alexander Galicia MD   oxyCODONE-acetaminophen (PERCOCET) 7.5-325 MG per tablet Take 7.5 tablets by mouth every 6 hours as needed. 22  Yes Alexander Galicia MD   LORazepam (ATIVAN) 1 MG tablet Take 1 tablet by mouth nightly.   Yes Automatic Reconciliation, Ar   olopatadine (PATANOL) 0.1 % ophthalmic solution Place 1 drop into both eyes daily 22   Alexander Galicia MD   albuterol sulfate  (90 Base) MCG/ACT inhaler Inhale 2 puffs into the lungs every 6 hours as needed 18   Alexander Galicia MD       Current medications:    Current Facility-Administered Medications   Medication Dose Route Frequency Provider Last Rate Last Admin   • ceFAZolin (ANCEF) 2000 mg in sterile water 20 mL IV syringe  2,000 mg 
Statement Selected

## 2024-11-07 ENCOUNTER — LABORATORY RESULT (OUTPATIENT)
Age: 34
End: 2024-11-07

## 2024-11-07 ENCOUNTER — TRANSCRIPTION ENCOUNTER (OUTPATIENT)
Age: 34
End: 2024-11-07

## 2024-11-07 ENCOUNTER — APPOINTMENT (OUTPATIENT)
Dept: OBGYN | Facility: CLINIC | Age: 34
End: 2024-11-07

## 2024-11-07 ENCOUNTER — RX CHANGE (OUTPATIENT)
Age: 34
End: 2024-11-07

## 2024-11-07 ENCOUNTER — OUTPATIENT (OUTPATIENT)
Dept: OUTPATIENT SERVICES | Facility: HOSPITAL | Age: 34
LOS: 1 days | End: 2024-11-07
Payer: SELF-PAY

## 2024-11-07 VITALS — WEIGHT: 147 LBS | DIASTOLIC BLOOD PRESSURE: 90 MMHG | SYSTOLIC BLOOD PRESSURE: 130 MMHG | BODY MASS INDEX: 26.04 KG/M2

## 2024-11-07 DIAGNOSIS — Z01.419 ENCOUNTER FOR GYNECOLOGICAL EXAMINATION (GENERAL) (ROUTINE) W/OUT ABNORMAL FINDINGS: ICD-10-CM

## 2024-11-07 DIAGNOSIS — Z87.42 PERSONAL HISTORY OF OTHER DISEASES OF THE FEMALE GENITAL TRACT: ICD-10-CM

## 2024-11-07 DIAGNOSIS — Z23 ENCOUNTER FOR IMMUNIZATION: ICD-10-CM

## 2024-11-07 DIAGNOSIS — N76.0 ACUTE VAGINITIS: ICD-10-CM

## 2024-11-07 DIAGNOSIS — N91.1 SECONDARY AMENORRHEA: ICD-10-CM

## 2024-11-07 DIAGNOSIS — Z87.898 PERSONAL HISTORY OF OTHER SPECIFIED CONDITIONS: ICD-10-CM

## 2024-11-07 DIAGNOSIS — Z30.432 ENCOUNTER FOR REMOVAL OF INTRAUTERINE CONTRACEPTIVE DEVICE: ICD-10-CM

## 2024-11-07 DIAGNOSIS — B96.89 ACUTE VAGINITIS: ICD-10-CM

## 2024-11-07 DIAGNOSIS — R10.2 PELVIC AND PERINEAL PAIN: ICD-10-CM

## 2024-11-07 DIAGNOSIS — N89.8 OTHER SPECIFIED NONINFLAMMATORY DISORDERS OF VAGINA: ICD-10-CM

## 2024-11-07 DIAGNOSIS — Z90.722 ACQUIRED ABSENCE OF OVARIES, BILATERAL: ICD-10-CM

## 2024-11-07 DIAGNOSIS — Z98.51 TUBAL LIGATION STATUS: Chronic | ICD-10-CM

## 2024-11-07 DIAGNOSIS — R03.0 ELEVATED BLOOD-PRESSURE READING, W/OUT DIAGNOSIS OF HYPERTENSION: ICD-10-CM

## 2024-11-07 DIAGNOSIS — Z30.9 ENCOUNTER FOR CONTRACEPTIVE MANAGEMENT, UNSPECIFIED: ICD-10-CM

## 2024-11-07 DIAGNOSIS — Z11.3 ENCOUNTER FOR SCREENING FOR INFECTIONS WITH A PREDOMINANTLY SEXUAL MODE OF TRANSMISSION: ICD-10-CM

## 2024-11-07 LAB
BILIRUB UR QL STRIP: NORMAL
CLARITY UR: CLEAR
COLLECTION METHOD: NORMAL
GLUCOSE UR-MCNC: NORMAL
HCG UR QL: 0.2 EU/DL
HCG UR QL: NEGATIVE
HGB UR QL STRIP.AUTO: NORMAL
KETONES UR-MCNC: NORMAL
LEUKOCYTE ESTERASE UR QL STRIP: NORMAL
NITRITE UR QL STRIP: NORMAL
PH UR STRIP: 6.5
PROT UR STRIP-MCNC: NORMAL
QUALITY CONTROL: YES
SP GR UR STRIP: 1.02

## 2024-11-07 PROCEDURE — 87521 HEPATITIS C PROBE&RVRS TRNSC: CPT

## 2024-11-07 PROCEDURE — 87624 HPV HI-RISK TYP POOLED RSLT: CPT

## 2024-11-07 PROCEDURE — T1013: CPT

## 2024-11-07 PROCEDURE — 87389 HIV-1 AG W/HIV-1&-2 AB AG IA: CPT

## 2024-11-07 PROCEDURE — 87340 HEPATITIS B SURFACE AG IA: CPT

## 2024-11-07 PROCEDURE — G0444 DEPRESSION SCREEN ANNUAL: CPT | Mod: 59

## 2024-11-07 PROCEDURE — 86803 HEPATITIS C AB TEST: CPT

## 2024-11-07 PROCEDURE — 87491 CHLMYD TRACH DNA AMP PROBE: CPT

## 2024-11-07 PROCEDURE — 99395 PREV VISIT EST AGE 18-39: CPT | Mod: 25

## 2024-11-07 PROCEDURE — 86780 TREPONEMA PALLIDUM: CPT

## 2024-11-07 PROCEDURE — 81025 URINE PREGNANCY TEST: CPT

## 2024-11-07 PROCEDURE — 81002 URINALYSIS NONAUTO W/O SCOPE: CPT

## 2024-11-07 PROCEDURE — G0463: CPT

## 2024-11-07 PROCEDURE — 87591 N.GONORRHOEAE DNA AMP PROB: CPT

## 2024-11-07 RX ORDER — NORETHINDRONE 0.35 MG/1
0.35 TABLET ORAL
Qty: 28 | Refills: 8 | Status: ACTIVE | COMMUNITY
Start: 2024-11-07 | End: 1900-01-01

## 2024-11-08 LAB
C TRACH RRNA SPEC QL NAA+PROBE: SIGNIFICANT CHANGE UP
HBV SURFACE AG SER-ACNC: SIGNIFICANT CHANGE UP
HCV AB S/CO SERPL IA: 2.02 S/CO — HIGH (ref 0–0.99)
HCV AB SERPL-IMP: REACTIVE
HCV RNA FLD QL NAA+PROBE: SIGNIFICANT CHANGE UP
HCV RNA SPEC QL PROBE+SIG AMP: SIGNIFICANT CHANGE UP
HIV 1+2 AB+HIV1 P24 AG SERPL QL IA: SIGNIFICANT CHANGE UP
HPV HIGH+LOW RISK DNA PNL CVX: SIGNIFICANT CHANGE UP
N GONORRHOEA RRNA SPEC QL NAA+PROBE: SIGNIFICANT CHANGE UP
SPECIMEN SOURCE: SIGNIFICANT CHANGE UP
T PALLIDUM AB TITR SER: NEGATIVE — SIGNIFICANT CHANGE UP

## 2024-11-11 LAB — CYTOLOGY SPEC DOC CYTO: SIGNIFICANT CHANGE UP

## 2024-11-13 DIAGNOSIS — Z30.9 ENCOUNTER FOR CONTRACEPTIVE MANAGEMENT, UNSPECIFIED: ICD-10-CM

## 2024-11-13 DIAGNOSIS — Z01.419 ENCOUNTER FOR GYNECOLOGICAL EXAMINATION (GENERAL) (ROUTINE) WITHOUT ABNORMAL FINDINGS: ICD-10-CM

## 2024-11-13 DIAGNOSIS — Z11.3 ENCOUNTER FOR SCREENING FOR INFECTIONS WITH A PREDOMINANTLY SEXUAL MODE OF TRANSMISSION: ICD-10-CM

## 2024-11-19 ENCOUNTER — APPOINTMENT (OUTPATIENT)
Dept: OTOLARYNGOLOGY | Facility: CLINIC | Age: 34
End: 2024-11-19
Payer: COMMERCIAL

## 2024-11-19 DIAGNOSIS — G43.909 MIGRAINE, UNSPECIFIED, NOT INTRACTABLE, W/OUT STATUS MIGRAINOSUS: ICD-10-CM

## 2024-11-19 DIAGNOSIS — H90.2 CONDUCTIVE HEARING LOSS, UNSPECIFIED: ICD-10-CM

## 2024-11-19 DIAGNOSIS — L29.9 PRURITUS, UNSPECIFIED: ICD-10-CM

## 2024-11-19 DIAGNOSIS — H61.21 IMPACTED CERUMEN, RIGHT EAR: ICD-10-CM

## 2024-11-19 DIAGNOSIS — H90.11 CONDUCTIVE HEARING LOSS, UNILATERAL, RIGHT EAR, WITH UNRESTRICTED HEARING ON THE CONTRALATERAL SIDE: ICD-10-CM

## 2024-11-19 DIAGNOSIS — R42 DIZZINESS AND GIDDINESS: ICD-10-CM

## 2024-11-19 PROCEDURE — 99214 OFFICE O/P EST MOD 30 MIN: CPT | Mod: 25

## 2024-11-19 PROCEDURE — 69210 REMOVE IMPACTED EAR WAX UNI: CPT

## 2024-11-19 PROCEDURE — 99213 OFFICE O/P EST LOW 20 MIN: CPT | Mod: 25

## 2024-11-19 RX ORDER — PREDNISOLONE ACETATE 10 MG/ML
1 SUSPENSION/ DROPS OPHTHALMIC
Qty: 1 | Refills: 1 | Status: ACTIVE | COMMUNITY
Start: 2024-11-19 | End: 1900-01-01

## 2025-01-04 ENCOUNTER — EMERGENCY (EMERGENCY)
Facility: HOSPITAL | Age: 35
LOS: 1 days | Discharge: HOME | End: 2025-01-04
Attending: EMERGENCY MEDICINE
Payer: MEDICAID

## 2025-01-04 VITALS
HEART RATE: 81 BPM | OXYGEN SATURATION: 98 % | DIASTOLIC BLOOD PRESSURE: 83 MMHG | SYSTOLIC BLOOD PRESSURE: 131 MMHG | RESPIRATION RATE: 18 BRPM

## 2025-01-04 VITALS
TEMPERATURE: 98 F | HEIGHT: 61 IN | SYSTOLIC BLOOD PRESSURE: 156 MMHG | RESPIRATION RATE: 20 BRPM | DIASTOLIC BLOOD PRESSURE: 91 MMHG | HEART RATE: 95 BPM | OXYGEN SATURATION: 99 % | WEIGHT: 149.03 LBS

## 2025-01-04 DIAGNOSIS — Z98.51 TUBAL LIGATION STATUS: Chronic | ICD-10-CM

## 2025-01-04 PROCEDURE — 73564 X-RAY EXAM KNEE 4 OR MORE: CPT | Mod: 26,RT

## 2025-01-04 PROCEDURE — 99283 EMERGENCY DEPT VISIT LOW MDM: CPT | Mod: 25

## 2025-01-04 PROCEDURE — 99284 EMERGENCY DEPT VISIT MOD MDM: CPT

## 2025-01-04 PROCEDURE — 73564 X-RAY EXAM KNEE 4 OR MORE: CPT

## 2025-01-04 RX ORDER — IBUPROFEN 200 MG
600 TABLET ORAL ONCE
Refills: 0 | Status: COMPLETED | OUTPATIENT
Start: 2025-01-04 | End: 2025-01-04

## 2025-01-04 RX ORDER — ACETAMINOPHEN 80 MG/.8ML
975 SOLUTION/ DROPS ORAL ONCE
Refills: 0 | Status: COMPLETED | OUTPATIENT
Start: 2025-01-04 | End: 2025-01-04

## 2025-01-04 RX ADMIN — Medication 600 MILLIGRAM(S): at 21:47

## 2025-01-04 RX ADMIN — ACETAMINOPHEN 975 MILLIGRAM(S): 80 SOLUTION/ DROPS ORAL at 21:48

## 2025-01-04 NOTE — ED PROVIDER NOTE - NSFOLLOWUPCLINICS_GEN_ALL_ED_FT
[facility]  Sports Medicine  [address] [place] [address]  Phone: [phone]  Fax:

## 2025-01-04 NOTE — ED ADULT NURSE NOTE - NSFALLRISKINTERV_ED_ALL_ED

## 2025-01-04 NOTE — ED PROVIDER NOTE - NSFOLLOWUPINSTRUCTIONS_ED_ALL_ED_FT
Please see the information of Knee pain, Knee immobilizer, and crutch instruction.    Ice to pain area; every 2hours for 20minutes.    Keep the immobilizer and use crutches as instructed.    Take Ibuprofen (600mg every 8hours with food) or Tylenol (2 tablets of 500mg every 8hours) as needed for pain.    Follow up with sports medicine clinic ([phone]) for reevaluation, call Monday for appointment.    Return for any concerns, fever, chills, numbness, weakness, or worsening pain.

## 2025-01-04 NOTE — ED PROVIDER NOTE - PHYSICAL EXAMINATION
NAD. VSS. Afebrile. Neck supple. Lungs clear. No spinal tender. No chest wall, rib, or cva tender. ABD soft, non tender. No hip tender. +Right knee; medial tender, mild swelling, and diminished ROMs. No patella tender. N/V- intact.

## 2025-01-04 NOTE — ED PROVIDER NOTE - PATIENT PORTAL LINK FT
You can access the FollowMyHealth Patient Portal offered by [facility] by registering at the following website: [email or web address]. By joining [employer]’s FollowMyHealth portal, you will also be able to view your health information using other applications (apps) compatible with our system.

## 2025-01-04 NOTE — ED PROVIDER NOTE - CLINICAL SUMMARY MEDICAL DECISION MAKING FREE TEXT BOX
AIDAN Hall MD: Pt is a 34F with no sig PMH who p/w R knee pain s/p mechanical fall while playing soccer, c/o pain with ambulation to medial aspect of ankle. Denies focal numbness/weakness. Plan: XR, pain control, reassess

## 2025-01-04 NOTE — ED ADULT NURSE NOTE - OBJECTIVE STATEMENT
34y Female AOx4 presents to the ED c/o knee pain. Pt states she was playing soccer with her son when she had mechanical fall. Endorses R knee pain. Denies head strike, LOC or ac use. Denies N/V, fever/chills, SOB, chest pain. Spontaneous/unlabored respirations, speaking in full sentences. Side rails up, bed in lowest position, safety maintained.

## 2025-01-04 NOTE — ED PROVIDER NOTE - OBJECTIVE STATEMENT
35yo female, no PMHx presents to ED with right knee pain s/p mechanical fall while playing soccer this evening. Denies LOC, head injury, or other injuries. Denies sensory changes or weakness to extremities. Denies headache, dizziness, neck pain or back pain. Denies CP/SOB/ABD pain or hip pain. Denies fever, chills or resent sickness.

## 2025-01-07 ENCOUNTER — APPOINTMENT (OUTPATIENT)
Dept: ORTHOPEDIC SURGERY | Facility: CLINIC | Age: 35
End: 2025-01-07

## 2025-01-29 ENCOUNTER — APPOINTMENT (OUTPATIENT)
Dept: MRI IMAGING | Facility: CLINIC | Age: 35
End: 2025-01-29
Payer: COMMERCIAL

## 2025-01-29 ENCOUNTER — APPOINTMENT (OUTPATIENT)
Dept: ORTHOPEDIC SURGERY | Facility: HOSPITAL | Age: 35
End: 2025-01-29

## 2025-01-29 VITALS
TEMPERATURE: 98.1 F | WEIGHT: 147 LBS | BODY MASS INDEX: 26.05 KG/M2 | HEIGHT: 63 IN | DIASTOLIC BLOOD PRESSURE: 65 MMHG | HEART RATE: 66 BPM | SYSTOLIC BLOOD PRESSURE: 134 MMHG

## 2025-01-29 DIAGNOSIS — S89.91XA UNSPECIFIED INJURY OF RIGHT LOWER LEG, INITIAL ENCOUNTER: ICD-10-CM

## 2025-01-29 DIAGNOSIS — M25.561 PAIN IN RIGHT KNEE: ICD-10-CM

## 2025-01-29 DIAGNOSIS — S86.911A STRAIN OF UNSPECIFIED MUSCLE(S) AND TENDON(S) AT LOWER LEG LEVEL, RIGHT LEG, INITIAL ENCOUNTER: ICD-10-CM

## 2025-01-29 PROCEDURE — 73721 MRI JNT OF LWR EXTRE W/O DYE: CPT | Mod: 26,RT

## 2025-02-05 PROBLEM — S83.241A ACUTE MEDIAL MENISCUS TEAR OF RIGHT KNEE, INITIAL ENCOUNTER: Status: ACTIVE | Noted: 2025-02-05

## 2025-02-05 PROBLEM — S83.511A RUPTURE OF ANTERIOR CRUCIATE LIGAMENT OF RIGHT KNEE, INITIAL ENCOUNTER: Status: ACTIVE | Noted: 2025-02-05

## 2025-02-05 PROBLEM — S89.91XD KNEE INJURY, RIGHT, SUBSEQUENT ENCOUNTER: Status: ACTIVE | Noted: 2025-02-05
